# Patient Record
Sex: FEMALE | Race: WHITE | URBAN - METROPOLITAN AREA
[De-identification: names, ages, dates, MRNs, and addresses within clinical notes are randomized per-mention and may not be internally consistent; named-entity substitution may affect disease eponyms.]

---

## 2022-12-13 NOTE — PROGRESS NOTES
[facility]   Cardiac Evaluation      Patient: Bradford Ortiz  YOB: 1934         Chief Complaint   Patient presents with    Hypertension    Hyperlipidemia        Referring provider: Kaela Boss MD, MD    History of Present Illness:    Ms Trey Brown is seen as a new patient. She is a self referral after hospitalization 10/3-10/4/22. Horton Cheadle was hospitalized with expressive aphasia and hand numbness for 2 hours prior to hospital arrival. Her symptoms resolved. Carotid doppler revealed <50% stenosis bilaterally. She had an echo with EF 55%, moderate AR, mild MR. She was started on ASA and Lipitor 80mg daily. She had previously been taken off Simvastatin by her PMD because of her age that she took for 40+ years. She has a strong family history of early heart disease in her mother and aunts. Ms Trey Brown states she feels well now. She has no residual affects from TIA. She fell 8/22 and fractured her left hip and underwent surgery. Horton Cheadle denies chest pain, palpitations, tachycardia, dizziness, CHRISTIE, or edema. She is physically active. Past Medical History:   has a past medical history of Hyperlipidemia and Hypertension. Surgical History:   has a past surgical history that includes Hysterectomy and Partial hip arthroplasty (Left).      Current Outpatient Medications   Medication Sig Dispense Refill    atorvastatin (LIPITOR) 80 MG tablet       citalopram (CELEXA) 20 MG tablet       diclofenac (VOLTAREN) 75 MG EC tablet TAKE ONE TABLET BY MOUTH EVERY DAY      vitamin D (ERGOCALCIFEROL) 1.25 MG (55532 UT) CAPS capsule TAKE ONE CAPSULE BY MOUTH Every week on sunday      furosemide (LASIX) 20 MG tablet TAKE ONE TABLET BY MOUTH EVERY DAY      ibandronate (BONIVA) 150 MG tablet Take 150 mg by mouth      levothyroxine (SYNTHROID) 50 MCG tablet       losartan (COZAAR) 25 MG tablet       magnesium oxide (MAG-OX) 400 MG tablet Take 400 mg by mouth daily      pantoprazole (PROTONIX) 40 MG tablet       aspirin 81 MG EC tablet Take 81 mg by mouth daily       No current facility-administered medications for this visit. Allergies:  Patient has no known allergies. Social History:  Social History     Socioeconomic History    Marital status:      Spouse name: Not on file    Number of children: Not on file    Years of education: Not on file    Highest education level: Not on file   Occupational History    Not on file   Tobacco Use    Smoking status: Former     Types: Cigarettes     Quit date: 26     Years since quittin.9    Smokeless tobacco: Never   Substance and Sexual Activity    Alcohol use: Yes     Comment: social    Drug use: Not on file    Sexual activity: Not on file   Other Topics Concern    Not on file   Social History Narrative    Not on file     Social Determinants of Health     Financial Resource Strain: Not on file   Food Insecurity: Not on file   Transportation Needs: Not on file   Physical Activity: Not on file   Stress: Not on file   Social Connections: Not on file   Intimate Partner Violence: Not on file   Housing Stability: Not on file       Family History:   History reviewed. No pertinent family history. Family history has been reviewed and not pertinent except as noted above. Review of Systems:   Constitutional: there has been no unanticipated weight loss. No change in energy or activity level   Eyes: No visual changes   ENT: No Headaches, hearing loss or vertigo. No mouth sores or sore throat. Cardiovascular: Reviewed in HPI  Respiratory: No cough or wheezing, no sputum production. Gastrointestinal: No abdominal pain, appetite loss, blood in stools. No change in bowel or bladder habits. Genitourinary: No nocturia, dysuria, trouble voiding  Musculoskeletal:  No gait disturbance, weakness or joint complaints. Integumentary: No rash or pruritis. Neurological: No headache, change in muscle strength, numbness or tingling.  No change in gait, balance, coordination, mood, affect, memory, mentation, behavior. Psychiatric: No anxiety or depression  Endocrine: No malaise or fever  Hematologic/Lymphatic: No abnormal bruising or bleeding, blood clots or swollen lymph nodes. Allergic/Immunologic: No nasal congestion or hives. Physical Examination:    Vitals:    12/21/22 1400 12/21/22 1402   BP: (!) 144/70 (!) 140/70   Site: Left Upper Arm Left Upper Arm   Position: Sitting Sitting   Cuff Size: Medium Adult Medium Adult   Pulse:  77   SpO2:  98%   Weight: 172 lb (78 kg)    Height: 5' 5\" (1.651 m)      Body mass index is 28.62 kg/m². Wt Readings from Last 3 Encounters:   12/21/22 172 lb (78 kg)      BP Readings from Last 3 Encounters:   12/21/22 (!) 140/70        Physical Examination:    CONSTITUTIONAL: Well developed, well nourished  EYES: PERRLA. No xanthelasma, sclera non icteric  EARS,NOSE,MOUTH,THROAT:  Mucous membranes moist, normal hearing  NECK: Supple, JVP normal, thyroid not enlarged. Carotids 2+ without bruits  RESPIRATORY: Normal effort, no rales or rhonchi  CARDIOVASCULAR: Normal PMI, regular rate and rhythm, no murmurs, rub or gallop. No edema. Radial pulses present and equal  CHEST: No scar or masses  ABDOMEN: Normal bowel sounds. No masses or tenderness. No bruit  MUSCULOSKELETAL: No clubbing or cyanosis. Moves all extremities well. Normal gait  SKIN:  Warm and dry. No rashes  NEUROLOGIC: Cranial nerves intact. Alert and oriented  PSYCHIATRIC: Calm affect. Appears to have normal judgement and insight        Assessment/Plan  1. Hypertension, unspecified type - on Losartan 25mg daily, b/p controlled    2. Other hyperlipidemia Lipids 10/4/22: ; TRIG 173; HDL 57;  - started on Lipitor 80mg daily after it had been stopped previously. She states she had taken Simvastatin for 40+ years   3. TIA (transient ischemic attack) - no residual affects. I  have told her she should not stop her statin sagain.   At her age she is a higher risk for stroke and needs this medication more than ever. Echo 10/4/22: EF 55-60%, grade I DD, moderate AR, mild MR, agitated saline bubble study negative for intra-cardiac shunting. Carotid doppler 10/4/22: <50% bilateral       EKG today>SR, RBBB    PLAN:  Will repeat lipids and CMP. She is encouraged to continue to stay active. FU     Scribe's attestation: This note was scribed in the presence of Dr Santi Machado MD by Gladys Aviles RN. The scribe's documentation has been prepared under my direction and personally reviewed by me in its entirety. I confirm that the note above accurately reflects all work, treatment, procedures, and medical decision making performed by me. Thank you for allowing to me to participate in the care of Robb Long.

## 2022-12-21 ENCOUNTER — OFFICE VISIT (OUTPATIENT)
Dept: CARDIOLOGY CLINIC | Age: 87
End: 2022-12-21

## 2022-12-21 VITALS
SYSTOLIC BLOOD PRESSURE: 140 MMHG | HEART RATE: 77 BPM | DIASTOLIC BLOOD PRESSURE: 70 MMHG | OXYGEN SATURATION: 98 % | HEIGHT: 65 IN | BODY MASS INDEX: 28.66 KG/M2 | WEIGHT: 172 LBS

## 2022-12-21 DIAGNOSIS — E78.49 OTHER HYPERLIPIDEMIA: ICD-10-CM

## 2022-12-21 DIAGNOSIS — G45.9 TIA (TRANSIENT ISCHEMIC ATTACK): ICD-10-CM

## 2022-12-21 DIAGNOSIS — I10 HYPERTENSION, UNSPECIFIED TYPE: Primary | ICD-10-CM

## 2022-12-21 PROBLEM — E78.2 MIXED HYPERLIPIDEMIA: Status: ACTIVE | Noted: 2019-01-22

## 2022-12-21 PROBLEM — I15.0 RENOVASCULAR HYPERTENSION: Status: ACTIVE | Noted: 2022-11-15

## 2022-12-21 RX ORDER — ATORVASTATIN CALCIUM 80 MG/1
TABLET, FILM COATED ORAL
COMMUNITY
Start: 2022-12-18

## 2022-12-21 RX ORDER — FUROSEMIDE 20 MG/1
TABLET ORAL
COMMUNITY
Start: 2022-11-28

## 2022-12-21 RX ORDER — MAGNESIUM OXIDE 400 MG/1
400 TABLET ORAL DAILY
COMMUNITY

## 2022-12-21 RX ORDER — LEVOTHYROXINE SODIUM 0.05 MG/1
TABLET ORAL
COMMUNITY
Start: 2022-12-18

## 2022-12-21 RX ORDER — LOSARTAN POTASSIUM 25 MG/1
TABLET ORAL
COMMUNITY
Start: 2022-12-18

## 2022-12-21 RX ORDER — DICLOFENAC SODIUM 75 MG/1
TABLET, DELAYED RELEASE ORAL
COMMUNITY
Start: 2022-12-15

## 2022-12-21 RX ORDER — ASPIRIN 81 MG/1
81 TABLET ORAL DAILY
COMMUNITY

## 2022-12-21 RX ORDER — PANTOPRAZOLE SODIUM 40 MG/1
TABLET, DELAYED RELEASE ORAL
COMMUNITY
Start: 2022-12-18

## 2022-12-21 RX ORDER — CITALOPRAM 20 MG/1
TABLET ORAL
COMMUNITY
Start: 2022-12-18

## 2022-12-21 RX ORDER — IBANDRONATE SODIUM 150 MG/1
150 TABLET, FILM COATED ORAL
COMMUNITY
Start: 2022-04-07

## 2022-12-21 RX ORDER — ERGOCALCIFEROL 1.25 MG/1
CAPSULE ORAL
COMMUNITY
Start: 2022-11-21

## 2023-01-23 ENCOUNTER — OFFICE VISIT (OUTPATIENT)
Dept: INTERNAL MEDICINE CLINIC | Age: 88
End: 2023-01-23

## 2023-01-23 ENCOUNTER — TELEPHONE (OUTPATIENT)
Dept: INTERNAL MEDICINE CLINIC | Age: 88
End: 2023-01-23

## 2023-01-23 VITALS
HEART RATE: 70 BPM | DIASTOLIC BLOOD PRESSURE: 70 MMHG | BODY MASS INDEX: 29.62 KG/M2 | OXYGEN SATURATION: 99 % | SYSTOLIC BLOOD PRESSURE: 122 MMHG | WEIGHT: 177.8 LBS | HEIGHT: 65 IN

## 2023-01-23 DIAGNOSIS — I67.9 CVD (CEREBROVASCULAR DISEASE): ICD-10-CM

## 2023-01-23 DIAGNOSIS — E78.2 MIXED HYPERLIPIDEMIA: ICD-10-CM

## 2023-01-23 DIAGNOSIS — E78.49 OTHER HYPERLIPIDEMIA: ICD-10-CM

## 2023-01-23 DIAGNOSIS — E55.9 VITAMIN D DEFICIENCY: ICD-10-CM

## 2023-01-23 DIAGNOSIS — E06.3 HYPOTHYROIDISM DUE TO HASHIMOTO'S THYROIDITIS: ICD-10-CM

## 2023-01-23 DIAGNOSIS — M35.3 POLYMYALGIA RHEUMATICA SYNDROME (HCC): ICD-10-CM

## 2023-01-23 DIAGNOSIS — N18.31 STAGE 3A CHRONIC KIDNEY DISEASE (HCC): ICD-10-CM

## 2023-01-23 DIAGNOSIS — F41.9 ANXIETY: ICD-10-CM

## 2023-01-23 DIAGNOSIS — I10 HYPERTENSION, UNSPECIFIED TYPE: Primary | ICD-10-CM

## 2023-01-23 DIAGNOSIS — E03.8 HYPOTHYROIDISM DUE TO HASHIMOTO'S THYROIDITIS: ICD-10-CM

## 2023-01-23 DIAGNOSIS — I15.0 RENOVASCULAR HYPERTENSION: ICD-10-CM

## 2023-01-23 PROBLEM — M81.0 POSTMENOPAUSAL OSTEOPOROSIS: Status: ACTIVE | Noted: 2022-05-15

## 2023-01-23 PROBLEM — M15.9 PRIMARY OSTEOARTHRITIS INVOLVING MULTIPLE JOINTS: Status: ACTIVE | Noted: 2022-05-15

## 2023-01-23 PROBLEM — K21.9 GERD (GASTROESOPHAGEAL REFLUX DISEASE): Status: ACTIVE | Noted: 2019-01-22

## 2023-01-23 PROBLEM — R94.2 DECREASED DIFFUSION CAPACITY OF LUNG: Status: ACTIVE | Noted: 2020-07-30

## 2023-01-23 PROBLEM — M15.0 PRIMARY OSTEOARTHRITIS INVOLVING MULTIPLE JOINTS: Status: ACTIVE | Noted: 2022-05-15

## 2023-01-23 PROBLEM — K59.04 FUNCTIONAL CONSTIPATION: Status: ACTIVE | Noted: 2019-01-22

## 2023-01-23 PROBLEM — D47.2 MGUS (MONOCLONAL GAMMOPATHY OF UNKNOWN SIGNIFICANCE): Status: ACTIVE | Noted: 2021-03-15

## 2023-01-23 RX ORDER — LEVOTHYROXINE SODIUM 0.05 MG/1
50 TABLET ORAL DAILY
Qty: 30 TABLET | Refills: 1 | Status: SHIPPED | OUTPATIENT
Start: 2023-01-23

## 2023-01-23 SDOH — ECONOMIC STABILITY: FOOD INSECURITY: WITHIN THE PAST 12 MONTHS, THE FOOD YOU BOUGHT JUST DIDN'T LAST AND YOU DIDN'T HAVE MONEY TO GET MORE.: NEVER TRUE

## 2023-01-23 SDOH — ECONOMIC STABILITY: FOOD INSECURITY: WITHIN THE PAST 12 MONTHS, YOU WORRIED THAT YOUR FOOD WOULD RUN OUT BEFORE YOU GOT MONEY TO BUY MORE.: NEVER TRUE

## 2023-01-23 ASSESSMENT — ENCOUNTER SYMPTOMS
COLOR CHANGE: 0
SINUS PAIN: 0
BLOOD IN STOOL: 0
SHORTNESS OF BREATH: 0
CONSTIPATION: 0
ABDOMINAL PAIN: 0
COUGH: 0
WHEEZING: 0
CHEST TIGHTNESS: 0

## 2023-01-23 ASSESSMENT — PATIENT HEALTH QUESTIONNAIRE - PHQ9
SUM OF ALL RESPONSES TO PHQ QUESTIONS 1-9: 0
SUM OF ALL RESPONSES TO PHQ QUESTIONS 1-9: 0
2. FEELING DOWN, DEPRESSED OR HOPELESS: 0
SUM OF ALL RESPONSES TO PHQ QUESTIONS 1-9: 0
1. LITTLE INTEREST OR PLEASURE IN DOING THINGS: 0
SUM OF ALL RESPONSES TO PHQ9 QUESTIONS 1 & 2: 0
SUM OF ALL RESPONSES TO PHQ QUESTIONS 1-9: 0

## 2023-01-23 ASSESSMENT — SOCIAL DETERMINANTS OF HEALTH (SDOH): HOW HARD IS IT FOR YOU TO PAY FOR THE VERY BASICS LIKE FOOD, HOUSING, MEDICAL CARE, AND HEATING?: NOT HARD AT ALL

## 2023-01-23 NOTE — PROGRESS NOTES
Fatimah Hernández (:  10/25/1934) is a 88 y.o. female,New patient, here for evaluation of the following chief complaint(s):  Established New Doctor         ASSESSMENT/PLAN:  1. Hypertension, unspecified type  -     Comprehensive Metabolic Panel; Future  -     CBC with Auto Differential; Future  2. Anxiety  3. Stage 3a chronic kidney disease (HCC)  4. Hypothyroidism due to Hashimoto's thyroiditis  -     TSH; Future  -     T4, Free; Future  5. Other hyperlipidemia  6. Renovascular hypertension  7. Vitamin D deficiency  -     Vitamin D 25 Hydroxy; Future  8. Polymyalgia rheumatica syndrome (HCC)  -     Sedimentation Rate; Future  -     C-Reactive Protein; Future  9. CVD (cerebrovascular disease)  -     Lipid Panel; Future  10. Mixed hyperlipidemia  -     Lipid Panel; Future  .  The patient has medical history medications past history preventative care    The patient has history of an underactive thyroid she was recently had her dose adjusted but she did not start yet on the Synthroid of 75 mcg upon reviewing her labs her TSH was only 4.6-20.1 above the cutoff and given her age that is significantly lower than the target so at this stage I did advise the patient not to to start on the new dose and as a matter fact we should consider lowering the dose if at all possible so the patient is going to take half of these 0.75 mg to see if that will go well and she will report to me if she start having any side effects or symptoms    The patient has history of cerebrovascular disease she did have at least one remote stroke in the past she is on statin treatment and aspirin at this point    We will get a check her liver function test as well as her cholesterol profile in about 6 weeks    Patient did have history of polymyalgia rheumatica and at this point we will get a check her inflammatory markers.    Patient has been on vitamin D replacement therapy with a 50,000 units/week we will get a check her levels make sure that  she is not elevated and depending on the reading will decide if she should continue on such a high dose or if she should go down to the maintenance dosage of it    Patient blood pressure is well controlled we will get a keep her on the current medication  Return in about 3 months (around 4/23/2023). Subjective   SUBJECTIVE/OBJECTIVE:    Lab Review   No results found for: NA, K, CO2, BUN, CREATININE, GLUCOSE, CALCIUM  No results found for: WBC, HGB, HCT, MCV, PLT  No results found for: CHOL, TRIG, HDL, LDLDIRECT    Vitals 1/23/2023 12/21/2022 40/52/6821   SYSTOLIC [phone]   DIASTOLIC 70 70 70   Site - Left Upper Arm Left Upper Arm   Position - Sitting Sitting   Cuff Size - Medium Adult Medium Adult   Pulse 70 77 -   SpO2 99 98 -   Weight 177 lb 12.8 oz - 172 lb   Height 5' 5\" - 5' 5\"   Body mass index 29.58 kg/m2 - 28.62 kg/m2   Some recent data might be hidden       Hypertension  This is a chronic problem. The current episode started more than 1 year ago. The problem is unchanged. The problem is controlled. Pertinent negatives include no chest pain, headaches, palpitations, peripheral edema, PND or shortness of breath. Hyperlipidemia  This is a chronic problem. The current episode started more than 1 year ago. The problem is controlled. Recent lipid tests were reviewed and are normal. Pertinent negatives include no chest pain, myalgias or shortness of breath. Review of Systems   Constitutional:  Negative for activity change, appetite change, fatigue and unexpected weight change. HENT:  Negative for congestion, ear pain and sinus pain. Respiratory:  Negative for cough, chest tightness, shortness of breath and wheezing. Cardiovascular:  Negative for chest pain, palpitations and PND. Gastrointestinal:  Negative for abdominal pain, blood in stool and constipation. Endocrine: Negative for cold intolerance, heat intolerance and polyuria.    Genitourinary:  Negative for dysuria, frequency and urgency. Musculoskeletal:  Negative for arthralgias and myalgias. Skin:  Negative for color change and rash. Neurological:  Negative for weakness and headaches. Hematological:  Negative for adenopathy. Does not bruise/bleed easily. Psychiatric/Behavioral:  Negative for agitation, dysphoric mood and sleep disturbance. Objective   Physical Exam  Vitals and nursing note reviewed. Constitutional:       General: She is not in acute distress. Appearance: Normal appearance. HENT:      Head: Normocephalic and atraumatic. Right Ear: Tympanic membrane normal.      Left Ear: Tympanic membrane normal.      Nose: Nose normal.   Eyes:      Extraocular Movements: Extraocular movements intact. Conjunctiva/sclera: Conjunctivae normal.      Pupils: Pupils are equal, round, and reactive to light. Neck:      Vascular: No carotid bruit. Cardiovascular:      Rate and Rhythm: Normal rate and regular rhythm. Pulses: Normal pulses. Heart sounds: No murmur heard. Pulmonary:      Effort: Pulmonary effort is normal. No respiratory distress. Breath sounds: Normal breath sounds. Abdominal:      General: Abdomen is flat. Bowel sounds are normal. There is no distension. Palpations: Abdomen is soft. Tenderness: There is no abdominal tenderness. Musculoskeletal:         General: No swelling, tenderness or deformity. Cervical back: Normal range of motion and neck supple. No rigidity or tenderness. Right lower leg: No edema. Left lower leg: No edema. Lymphadenopathy:      Cervical: No cervical adenopathy. Skin:     Coloration: Skin is not jaundiced. Findings: No bruising, erythema or lesion. Neurological:      General: No focal deficit present. Mental Status: She is alert and oriented to person, place, and time. Cranial Nerves: No cranial nerve deficit. Motor: No weakness.       Gait: Gait normal.          This dictation was generated by voice recognition computer software. Although all attempts are made to edit the dictation for accuracy, there may be errors in the transcription that are not intended. An electronic signature was used to authenticate this note.     --Xiomara Alfredo MD

## 2023-01-23 NOTE — TELEPHONE ENCOUNTER
Called and spoke with pt in Mongolian, conveyed below results with understanding   Pt  calling requesting refill of  Levothyroxine---50 mcg      Last written   12/18/22 (previous Dr)  Last OV  1/23/23  Next OV  none   Last recommended OV NA    Please send to   [facility]

## 2023-01-24 DIAGNOSIS — E53.8 B12 DEFICIENCY: ICD-10-CM

## 2023-01-24 DIAGNOSIS — E53.8 B12 DEFICIENCY: Primary | ICD-10-CM

## 2023-01-24 DIAGNOSIS — I10 HYPERTENSION, UNSPECIFIED TYPE: ICD-10-CM

## 2023-01-24 DIAGNOSIS — E03.8 HYPOTHYROIDISM DUE TO HASHIMOTO'S THYROIDITIS: ICD-10-CM

## 2023-01-24 DIAGNOSIS — E55.9 VITAMIN D DEFICIENCY: ICD-10-CM

## 2023-01-24 DIAGNOSIS — M35.3 POLYMYALGIA RHEUMATICA SYNDROME (HCC): ICD-10-CM

## 2023-01-24 DIAGNOSIS — I67.9 CVD (CEREBROVASCULAR DISEASE): ICD-10-CM

## 2023-01-24 DIAGNOSIS — E78.2 MIXED HYPERLIPIDEMIA: ICD-10-CM

## 2023-01-24 DIAGNOSIS — E06.3 HYPOTHYROIDISM DUE TO HASHIMOTO'S THYROIDITIS: ICD-10-CM

## 2023-01-24 LAB
A/G RATIO: 1.7 (ref 1.1–2.2)
ALBUMIN SERPL-MCNC: 4.2 G/DL (ref 3.4–5)
ALP BLD-CCNC: 80 U/L (ref 40–129)
ALT SERPL-CCNC: 17 U/L (ref 10–40)
ANION GAP SERPL CALCULATED.3IONS-SCNC: 17 MMOL/L (ref 3–16)
AST SERPL-CCNC: 25 U/L (ref 15–37)
BASOPHILS ABSOLUTE: 0 K/UL (ref 0–0.2)
BASOPHILS RELATIVE PERCENT: 0.5 %
BILIRUB SERPL-MCNC: 0.7 MG/DL (ref 0–1)
BUN BLDV-MCNC: 20 MG/DL (ref 7–20)
C-REACTIVE PROTEIN: <3 MG/L (ref 0–5.1)
CALCIUM SERPL-MCNC: 9.3 MG/DL (ref 8.3–10.6)
CHLORIDE BLD-SCNC: 100 MMOL/L (ref 99–110)
CHOLESTEROL, TOTAL: 176 MG/DL (ref 0–199)
CO2: 24 MMOL/L (ref 21–32)
CREAT SERPL-MCNC: 0.7 MG/DL (ref 0.6–1.2)
EOSINOPHILS ABSOLUTE: 0.1 K/UL (ref 0–0.6)
EOSINOPHILS RELATIVE PERCENT: 1.5 %
FOLATE: >20 NG/ML (ref 4.78–24.2)
GFR SERPL CREATININE-BSD FRML MDRD: >60 ML/MIN/{1.73_M2}
GLUCOSE BLD-MCNC: 95 MG/DL (ref 70–99)
HCT VFR BLD CALC: 32.6 % (ref 36–48)
HDLC SERPL-MCNC: 80 MG/DL (ref 40–60)
HEMOGLOBIN: 10.8 G/DL (ref 12–16)
LDL CHOLESTEROL CALCULATED: 81 MG/DL
LYMPHOCYTES ABSOLUTE: 1.3 K/UL (ref 1–5.1)
LYMPHOCYTES RELATIVE PERCENT: 20.3 %
MCH RBC QN AUTO: 34.2 PG (ref 26–34)
MCHC RBC AUTO-ENTMCNC: 33.3 G/DL (ref 31–36)
MCV RBC AUTO: 102.8 FL (ref 80–100)
MONOCYTES ABSOLUTE: 0.5 K/UL (ref 0–1.3)
MONOCYTES RELATIVE PERCENT: 8.4 %
NEUTROPHILS ABSOLUTE: 4.4 K/UL (ref 1.7–7.7)
NEUTROPHILS RELATIVE PERCENT: 69.3 %
PDW BLD-RTO: 14.4 % (ref 12.4–15.4)
PLATELET # BLD: 268 K/UL (ref 135–450)
PMV BLD AUTO: 8.7 FL (ref 5–10.5)
POTASSIUM SERPL-SCNC: 4.8 MMOL/L (ref 3.5–5.1)
RBC # BLD: 3.17 M/UL (ref 4–5.2)
SEDIMENTATION RATE, ERYTHROCYTE: 19 MM/HR (ref 0–30)
SODIUM BLD-SCNC: 141 MMOL/L (ref 136–145)
T4 FREE: 1.4 NG/DL (ref 0.9–1.8)
TOTAL PROTEIN: 6.7 G/DL (ref 6.4–8.2)
TRIGL SERPL-MCNC: 75 MG/DL (ref 0–150)
TSH SERPL DL<=0.05 MIU/L-ACNC: 2.53 UIU/ML (ref 0.27–4.2)
VITAMIN B-12: 381 PG/ML (ref 211–911)
VITAMIN D 25-HYDROXY: 56.8 NG/ML
VLDLC SERPL CALC-MCNC: 15 MG/DL
WBC # BLD: 6.3 K/UL (ref 4–11)

## 2023-01-24 RX ORDER — CYANOCOBALAMIN 1000 UG/ML
1000 INJECTION, SOLUTION INTRAMUSCULAR; SUBCUTANEOUS
Qty: 4 ML | Refills: 0 | Status: SHIPPED | OUTPATIENT
Start: 2023-01-24

## 2023-02-06 ENCOUNTER — TELEPHONE (OUTPATIENT)
Dept: INTERNAL MEDICINE CLINIC | Age: 88
End: 2023-02-06

## 2023-02-06 NOTE — TELEPHONE ENCOUNTER
She does not need to be on the shots for the rest of her life usually at the start of the treatment I had the patient get the shots to get very high-dose very quickly into their body and then after that they continue with the pills

## 2023-02-06 NOTE — TELEPHONE ENCOUNTER
Patient states you told her she wouldn't have to take the injection of b12. Told her I would clarify and she could come into the office or we could call an order of needles in for pt to be able to administer the injection.

## 2023-02-06 NOTE — TELEPHONE ENCOUNTER
Pt calling she received B12 tablets and 4 vials of B12 for shots---she thought you told her she didn't have to do those---plus they came with no needles---please call the pt at [phone].   Thanks

## 2023-02-15 ENCOUNTER — NURSE ONLY (OUTPATIENT)
Dept: INTERNAL MEDICINE CLINIC | Age: 88
End: 2023-02-15
Payer: MEDICARE

## 2023-02-15 DIAGNOSIS — E53.8 B12 DEFICIENCY: Primary | ICD-10-CM

## 2023-02-15 PROCEDURE — 96372 THER/PROPH/DIAG INJ SC/IM: CPT | Performed by: INTERNAL MEDICINE

## 2023-02-15 RX ORDER — CYANOCOBALAMIN 1000 UG/ML
1000 INJECTION, SOLUTION INTRAMUSCULAR; SUBCUTANEOUS ONCE
Status: COMPLETED | OUTPATIENT
Start: 2023-02-15 | End: 2023-02-15

## 2023-02-15 RX ADMIN — CYANOCOBALAMIN 1000 MCG: 1000 INJECTION, SOLUTION INTRAMUSCULAR; SUBCUTANEOUS at 12:26

## 2023-02-22 ENCOUNTER — NURSE ONLY (OUTPATIENT)
Dept: INTERNAL MEDICINE CLINIC | Age: 88
End: 2023-02-22
Payer: MEDICARE

## 2023-02-22 DIAGNOSIS — E53.8 B12 DEFICIENCY: Primary | ICD-10-CM

## 2023-02-22 PROCEDURE — 96372 THER/PROPH/DIAG INJ SC/IM: CPT | Performed by: INTERNAL MEDICINE

## 2023-02-22 RX ORDER — CYANOCOBALAMIN 1000 UG/ML
1000 INJECTION, SOLUTION INTRAMUSCULAR; SUBCUTANEOUS ONCE
Status: COMPLETED | OUTPATIENT
Start: 2023-02-22 | End: 2023-02-22

## 2023-02-22 RX ADMIN — CYANOCOBALAMIN 1000 MCG: 1000 INJECTION, SOLUTION INTRAMUSCULAR; SUBCUTANEOUS at 16:14

## 2023-02-27 ENCOUNTER — PATIENT MESSAGE (OUTPATIENT)
Dept: INTERNAL MEDICINE CLINIC | Age: 88
End: 2023-02-27

## 2023-02-27 RX ORDER — ATORVASTATIN CALCIUM 80 MG/1
80 TABLET, FILM COATED ORAL DAILY
Qty: 30 TABLET | Refills: 0 | Status: SHIPPED | OUTPATIENT
Start: 2023-02-27

## 2023-02-27 RX ORDER — FUROSEMIDE 20 MG/1
TABLET ORAL
Qty: 60 TABLET | Refills: 0 | Status: SHIPPED | OUTPATIENT
Start: 2023-02-27

## 2023-02-27 RX ORDER — DICLOFENAC SODIUM 75 MG/1
TABLET, DELAYED RELEASE ORAL
Qty: 60 TABLET | Refills: 0 | Status: SHIPPED | OUTPATIENT
Start: 2023-02-27

## 2023-02-27 NOTE — TELEPHONE ENCOUNTER
Last OV: 1/23/2023  Next OV: Visit date not found  around 4/23/2023). Last fill:  Refills:      From: Jameson Pierre  To: Dr. Koroma Endow: 2/27/2023 10:54 AM EST  Subject: Refills    I need refills on 3 prescriptions. Atorvastin, Flourisomide and [place]. I was unable to request on line. My pharmacy is [facility]. [phone]. Thank you.

## 2023-02-28 ENCOUNTER — TELEPHONE (OUTPATIENT)
Dept: INTERNAL MEDICINE CLINIC | Age: 88
End: 2023-02-28

## 2023-02-28 DIAGNOSIS — R05.9 COUGH, UNSPECIFIED TYPE: Primary | ICD-10-CM

## 2023-02-28 NOTE — TELEPHONE ENCOUNTER
Patient took home covid test.  She is unsure of the directions and reading, only says she has a faint line. She is requesting covid test be ordered to have at office.

## 2023-02-28 NOTE — TELEPHONE ENCOUNTER
----- Message from Rosalie Chaves sent at 2/27/2023  3:57 PM EST -----  Subject: Message to Provider    QUESTIONS  Information for Provider? Pt took a home rapid Covid test. She did mention   that she's seeing a faint +line  ---------------------------------------------------------------------------  --------------  Rafael Mt INFO  [phone]; OK to leave message on voicemail  ---------------------------------------------------------------------------  --------------  SCRIPT ANSWERS  Relationship to Patient?  Self

## 2023-03-01 ENCOUNTER — NURSE ONLY (OUTPATIENT)
Dept: INTERNAL MEDICINE CLINIC | Age: 88
End: 2023-03-01

## 2023-03-01 DIAGNOSIS — R05.9 COUGH, UNSPECIFIED TYPE: ICD-10-CM

## 2023-03-01 DIAGNOSIS — E53.8 B12 DEFICIENCY: Primary | ICD-10-CM

## 2023-03-01 RX ORDER — CYANOCOBALAMIN 1000 UG/ML
1000 INJECTION, SOLUTION INTRAMUSCULAR; SUBCUTANEOUS ONCE
Status: COMPLETED | OUTPATIENT
Start: 2023-03-01 | End: 2023-03-01

## 2023-03-01 RX ADMIN — CYANOCOBALAMIN 1000 MCG: 1000 INJECTION, SOLUTION INTRAMUSCULAR; SUBCUTANEOUS at 12:38

## 2023-03-02 LAB — SARS-COV-2: NOT DETECTED

## 2023-03-03 ENCOUNTER — HOSPITAL ENCOUNTER (OUTPATIENT)
Age: 88
Discharge: HOME | End: 2023-03-03
Payer: MEDICARE

## 2023-03-03 ENCOUNTER — HOSPITAL ENCOUNTER (OUTPATIENT)
Dept: GENERAL RADIOLOGY | Age: 88
Discharge: HOME | End: 2023-03-03
Payer: MEDICARE

## 2023-03-03 ENCOUNTER — OFFICE VISIT (OUTPATIENT)
Dept: INTERNAL MEDICINE CLINIC | Age: 88
End: 2023-03-03
Payer: MEDICARE

## 2023-03-03 VITALS
HEART RATE: 103 BPM | DIASTOLIC BLOOD PRESSURE: 84 MMHG | HEIGHT: 65 IN | BODY MASS INDEX: 29.49 KG/M2 | OXYGEN SATURATION: 94 % | SYSTOLIC BLOOD PRESSURE: 136 MMHG | WEIGHT: 177 LBS

## 2023-03-03 DIAGNOSIS — J06.9 URTI (ACUTE UPPER RESPIRATORY INFECTION): ICD-10-CM

## 2023-03-03 DIAGNOSIS — E53.8 B12 DEFICIENCY: ICD-10-CM

## 2023-03-03 DIAGNOSIS — I10 HYPERTENSION, UNSPECIFIED TYPE: ICD-10-CM

## 2023-03-03 DIAGNOSIS — J06.9 URTI (ACUTE UPPER RESPIRATORY INFECTION): Primary | ICD-10-CM

## 2023-03-03 PROCEDURE — 99214 OFFICE O/P EST MOD 30 MIN: CPT | Performed by: INTERNAL MEDICINE

## 2023-03-03 PROCEDURE — 71046 X-RAY EXAM CHEST 2 VIEWS: CPT

## 2023-03-03 PROCEDURE — 1123F ACP DISCUSS/DSCN MKR DOCD: CPT | Performed by: INTERNAL MEDICINE

## 2023-03-03 RX ORDER — ALBUTEROL SULFATE 90 UG/1
2 AEROSOL, METERED RESPIRATORY (INHALATION) 4 TIMES DAILY PRN
Qty: 54 G | Refills: 1 | Status: SHIPPED | OUTPATIENT
Start: 2023-03-03

## 2023-03-03 RX ORDER — BENZONATATE 200 MG/1
200 CAPSULE ORAL 3 TIMES DAILY PRN
Qty: 30 CAPSULE | Refills: 0 | Status: SHIPPED | OUTPATIENT
Start: 2023-03-03 | End: 2023-03-10

## 2023-03-03 RX ORDER — AZITHROMYCIN 250 MG/1
250 TABLET, FILM COATED ORAL SEE ADMIN INSTRUCTIONS
Qty: 6 TABLET | Refills: 0 | Status: SHIPPED | OUTPATIENT
Start: 2023-03-03 | End: 2023-03-08

## 2023-03-03 RX ORDER — PREDNISONE 20 MG/1
20 TABLET ORAL 2 TIMES DAILY
Qty: 10 TABLET | Refills: 0 | Status: SHIPPED | OUTPATIENT
Start: 2023-03-03 | End: 2023-03-08

## 2023-03-03 SDOH — ECONOMIC STABILITY: INCOME INSECURITY: HOW HARD IS IT FOR YOU TO PAY FOR THE VERY BASICS LIKE FOOD, HOUSING, MEDICAL CARE, AND HEATING?: NOT VERY HARD

## 2023-03-03 SDOH — ECONOMIC STABILITY: HOUSING INSECURITY
IN THE LAST 12 MONTHS, WAS THERE A TIME WHEN YOU DID NOT HAVE A STEADY PLACE TO SLEEP OR SLEPT IN A SHELTER (INCLUDING NOW)?: NO

## 2023-03-03 SDOH — ECONOMIC STABILITY: FOOD INSECURITY: WITHIN THE PAST 12 MONTHS, THE FOOD YOU BOUGHT JUST DIDN'T LAST AND YOU DIDN'T HAVE MONEY TO GET MORE.: NEVER TRUE

## 2023-03-03 SDOH — ECONOMIC STABILITY: FOOD INSECURITY: WITHIN THE PAST 12 MONTHS, YOU WORRIED THAT YOUR FOOD WOULD RUN OUT BEFORE YOU GOT MONEY TO BUY MORE.: NEVER TRUE

## 2023-03-03 ASSESSMENT — ENCOUNTER SYMPTOMS
WHEEZING: 0
RHINORRHEA: 0
SHORTNESS OF BREATH: 0
COUGH: 1
SORE THROAT: 0

## 2023-03-03 NOTE — PROGRESS NOTES
Becky Hermosillo (:  1934) is a 80 y.o. female,Established patient, here for evaluation of the following chief complaint(s):  Congestion and Cough (Shortness of breath when moving around )         ASSESSMENT/PLAN:  1. URTI (acute upper respiratory infection)  -     benzonatate (TESSALON) 200 MG capsule; Take 1 capsule by mouth 3 times daily as needed for Cough, Disp-30 capsule, R-0Normal  -     predniSONE (DELTASONE) 20 MG tablet; Take 1 tablet by mouth 2 times daily for 5 days, Disp-10 tablet, R-0Normal  -     azithromycin (ZITHROMAX) 250 MG tablet; Take 1 tablet by mouth See Admin Instructions for 5 days 500mg on day 1 followed by 250mg on days 2 - 5, Disp-6 tablet, R-0Normal  -     XR CHEST STANDARD (2 VW); Future  -     albuterol sulfate HFA (VENTOLIN HFA) 108 (90 Base) MCG/ACT inhaler; Inhale 2 puffs into the lungs 4 times daily as needed for Wheezing, Disp-54 g, R-1Normal  2. Hypertension, unspecified type  3. B12 deficiency  -     CBC with Auto Differential; Future  -     Vitamin B12 & Folate; Future  Symptomatology she has symptoms consistent with a bronchitis her COVID is negative and its been more than 5 days at any rate but given that she is having wheezes in her lungs a chest x-ray will be done to make sure that she does not have a pneumonic process if the patient symptoms change or get worse she is to go to the emergency room directly for now we will get a start her on antibiotics prednisone cough medicine and inhalers chest x-ray is to be done this morning    Patient is taking her B12 we will get a check her blood count and B12 level before prior to her next visit    Patient blood pressure is well controlled at this time and again continue to monitor clinically  Return in about 6 weeks (around 2023).          Subjective   SUBJECTIVE/OBJECTIVE:    Lab Review   Lab Results   Component Value Date/Time     2023 10:53 AM    K 4.8 2023 10:53 AM    CO2 24 2023 10:53 AM BUN 20 01/24/2023 10:53 AM    CREATININE 0.7 01/24/2023 10:53 AM    GLUCOSE 95 01/24/2023 10:53 AM    CALCIUM 9.3 01/24/2023 10:53 AM     Lab Results   Component Value Date/Time    WBC 6.3 01/24/2023 10:53 AM    HGB 10.8 01/24/2023 10:53 AM    HCT 32.6 01/24/2023 10:53 AM    .8 01/24/2023 10:53 AM     01/24/2023 10:53 AM     Lab Results   Component Value Date/Time    CHOL 176 01/24/2023 10:53 AM    TRIG 75 01/24/2023 10:53 AM    HDL 80 01/24/2023 10:53 AM       Vitals 3/3/2023 1/23/2023 87/78/0215   SYSTOLIC 866 531 244   DIASTOLIC 84 70 70   Site - - Left Upper Arm   Position - - Sitting   Cuff Size - - Medium Adult   Pulse 103 70 77   SpO2 94 99 98   Weight 177 lb 177 lb 12.8 oz -   Height 5' 5\" 5' 5\" -   Body mass index 29.45 kg/m2 29.58 kg/m2 -   Some recent data might be hidden       Cough  This is a new problem. The current episode started yesterday. The cough is Productive of sputum and productive of purulent sputum. Pertinent negatives include no chest pain, chills, ear congestion, ear pain, rhinorrhea, sore throat, shortness of breath, sweats, weight loss or wheezing. Hypertension  This is a chronic problem. The current episode started more than 1 year ago. The problem is unchanged. The problem is controlled. Pertinent negatives include no chest pain, peripheral edema, PND, shortness of breath or sweats. Review of Systems   Constitutional:  Negative for chills and weight loss. HENT:  Negative for ear pain, rhinorrhea and sore throat. Respiratory:  Positive for cough. Negative for shortness of breath and wheezing. Cardiovascular:  Negative for chest pain and PND. Objective   Physical Exam       This dictation was generated by voice recognition computer software. Although all attempts are made to edit the dictation for accuracy, there may be errors in the transcription that are not intended. An electronic signature was used to authenticate this note.     --Hunter Theodore Debora Miner MD

## 2023-03-08 ENCOUNTER — NURSE ONLY (OUTPATIENT)
Dept: INTERNAL MEDICINE CLINIC | Age: 88
End: 2023-03-08
Payer: MEDICARE

## 2023-03-08 DIAGNOSIS — E53.8 B12 DEFICIENCY: Primary | ICD-10-CM

## 2023-03-08 PROCEDURE — 96372 THER/PROPH/DIAG INJ SC/IM: CPT | Performed by: INTERNAL MEDICINE

## 2023-03-08 RX ORDER — CYANOCOBALAMIN 1000 UG/ML
1000 INJECTION, SOLUTION INTRAMUSCULAR; SUBCUTANEOUS ONCE
Status: COMPLETED | OUTPATIENT
Start: 2023-03-08 | End: 2023-03-08

## 2023-03-08 RX ADMIN — CYANOCOBALAMIN 1000 MCG: 1000 INJECTION, SOLUTION INTRAMUSCULAR; SUBCUTANEOUS at 15:07

## 2023-03-24 RX ORDER — LEVOTHYROXINE SODIUM 0.05 MG/1
50 TABLET ORAL DAILY
Qty: 30 TABLET | Refills: 1 | Status: SHIPPED | OUTPATIENT
Start: 2023-03-24

## 2023-03-24 RX ORDER — ATORVASTATIN CALCIUM 80 MG/1
80 TABLET, FILM COATED ORAL DAILY
Qty: 30 TABLET | Refills: 0 | Status: SHIPPED | OUTPATIENT
Start: 2023-03-24

## 2023-03-28 ENCOUNTER — TELEPHONE (OUTPATIENT)
Dept: INTERNAL MEDICINE CLINIC | Age: 88
End: 2023-03-28

## 2023-03-28 RX ORDER — CITALOPRAM 20 MG/1
20 TABLET ORAL DAILY
Qty: 30 TABLET | Refills: 2 | Status: SHIPPED | OUTPATIENT
Start: 2023-03-28

## 2023-03-28 NOTE — TELEPHONE ENCOUNTER
Pt calling requesting refill of   Citalopram   Last written  12/18/22  Last OV  3/3/23  Next OV  4/14/23  Last recommended OV   NA    Please send to   [address]

## 2023-03-30 ENCOUNTER — OFFICE VISIT (OUTPATIENT)
Dept: INTERNAL MEDICINE CLINIC | Age: 88
End: 2023-03-30
Payer: MEDICARE

## 2023-03-30 VITALS
SYSTOLIC BLOOD PRESSURE: 136 MMHG | OXYGEN SATURATION: 97 % | BODY MASS INDEX: 28.79 KG/M2 | WEIGHT: 173 LBS | HEART RATE: 7 BPM | DIASTOLIC BLOOD PRESSURE: 88 MMHG

## 2023-03-30 DIAGNOSIS — M25.511 ACUTE PAIN OF RIGHT SHOULDER: ICD-10-CM

## 2023-03-30 DIAGNOSIS — R55 SYNCOPE, UNSPECIFIED SYNCOPE TYPE: Primary | ICD-10-CM

## 2023-03-30 LAB
BACTERIA URNS QL MICRO: ABNORMAL /HPF
BILIRUB UR QL STRIP.AUTO: NEGATIVE
BILIRUBIN, POC: NORMAL
BLOOD URINE, POC: NORMAL
CLARITY UR: ABNORMAL
CLARITY, POC: NORMAL
COLOR UR: YELLOW
COLOR, POC: NORMAL
EPI CELLS #/AREA URNS AUTO: 7 /HPF (ref 0–5)
GLUCOSE UR STRIP.AUTO-MCNC: NEGATIVE MG/DL
GLUCOSE URINE, POC: NORMAL
HGB UR QL STRIP.AUTO: NEGATIVE
HYALINE CASTS #/AREA URNS AUTO: 12 /LPF (ref 0–8)
KETONES UR STRIP.AUTO-MCNC: ABNORMAL MG/DL
KETONES, POC: NORMAL
LEUKOCYTE EST, POC: NORMAL
LEUKOCYTE ESTERASE UR QL STRIP.AUTO: ABNORMAL
NITRITE UR QL STRIP.AUTO: NEGATIVE
NITRITE, POC: NORMAL
PH UR STRIP.AUTO: 6 [PH] (ref 5–8)
PH, POC: 6
PROT UR STRIP.AUTO-MCNC: ABNORMAL MG/DL
PROTEIN, POC: NORMAL
RBC CLUMPS #/AREA URNS AUTO: 1 /HPF (ref 0–4)
SP GR UR STRIP.AUTO: 1.02 (ref 1–1.03)
SPECIFIC GRAVITY, POC: 1.02
UA DIPSTICK W REFLEX MICRO PNL UR: YES
URN SPEC COLLECT METH UR: ABNORMAL
UROBILINOGEN UR STRIP-ACNC: 0.2 E.U./DL
UROBILINOGEN, POC: NORMAL
WBC #/AREA URNS AUTO: 19 /HPF (ref 0–5)

## 2023-03-30 PROCEDURE — 99214 OFFICE O/P EST MOD 30 MIN: CPT | Performed by: INTERNAL MEDICINE

## 2023-03-30 PROCEDURE — 1123F ACP DISCUSS/DSCN MKR DOCD: CPT | Performed by: INTERNAL MEDICINE

## 2023-03-30 PROCEDURE — 81002 URINALYSIS NONAUTO W/O SCOPE: CPT | Performed by: INTERNAL MEDICINE

## 2023-03-30 ASSESSMENT — ENCOUNTER SYMPTOMS
NAUSEA: 0
VISUAL CHANGE: 0
BOWEL INCONTINENCE: 0
VOMITING: 0
ABDOMINAL PAIN: 0
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
## 2023-03-31 RX ORDER — SULFAMETHOXAZOLE AND TRIMETHOPRIM 800; 160 MG/1; MG/1
1 TABLET ORAL 2 TIMES DAILY
Qty: 10 TABLET | Refills: 0 | Status: SHIPPED | OUTPATIENT
Start: 2023-03-31 | End: 2023-04-05

## 2023-03-31 NOTE — RESULT ENCOUNTER NOTE
Please let the patient know that results show that she may have a mild urinary tract infection I would like the patient to take a course of antibiotics, prescription sent to the pharmacy

## 2023-04-20 RX ORDER — ATORVASTATIN CALCIUM 80 MG/1
80 TABLET, FILM COATED ORAL DAILY
Qty: 30 TABLET | Refills: 0 | Status: SHIPPED | OUTPATIENT
Start: 2023-04-20

## 2023-04-20 RX ORDER — FUROSEMIDE 20 MG/1
TABLET ORAL
Qty: 60 TABLET | Refills: 0 | Status: SHIPPED | OUTPATIENT
Start: 2023-04-20

## 2023-04-20 RX ORDER — DICLOFENAC SODIUM 75 MG/1
TABLET, DELAYED RELEASE ORAL
Qty: 60 TABLET | Refills: 0 | Status: SHIPPED | OUTPATIENT
Start: 2023-04-20

## 2023-04-27 ENCOUNTER — TELEPHONE (OUTPATIENT)
Dept: INTERNAL MEDICINE CLINIC | Age: 88
End: 2023-04-27

## 2023-05-03 ENCOUNTER — TELEPHONE (OUTPATIENT)
Dept: CARDIOLOGY CLINIC | Age: 88
End: 2023-05-03

## 2023-05-03 ENCOUNTER — TELEPHONE (OUTPATIENT)
Dept: INTERNAL MEDICINE CLINIC | Age: 88
End: 2023-05-03

## 2023-05-09 ENCOUNTER — TELEPHONE (OUTPATIENT)
Dept: INTERNAL MEDICINE CLINIC | Age: 88
End: 2023-05-09

## 2023-05-09 RX ORDER — CLOPIDOGREL BISULFATE 75 MG/1
75 TABLET ORAL DAILY
Qty: 30 TABLET | Refills: 0 | COMMUNITY
Start: 2023-05-05 | End: 2023-05-09 | Stop reason: SDUPTHER

## 2023-05-09 RX ORDER — CLOPIDOGREL BISULFATE 75 MG/1
75 TABLET ORAL DAILY
Qty: 90 TABLET | Refills: 1 | Status: SHIPPED | OUTPATIENT
Start: 2023-05-09 | End: 2023-06-08

## 2023-05-09 NOTE — TELEPHONE ENCOUNTER
Francisca with [facility] called. They rec'd orders on patient for SN eval. Need to know if you will sign off on orders for patient. Patient does have an appt with Kamryn Addy for a hfu on Friday due to PCP not being available due to vacation.  Please advise

## 2023-05-09 NOTE — TELEPHONE ENCOUNTER
Called Francisca to advise. I also advised her she may want to wait for patient to come to the visit before starting the care to be sure we have a face to face from our office.

## 2023-05-09 NOTE — TELEPHONE ENCOUNTER
Got her in with Sherry on Friday but she needs Plavix called in today---she had a stroke and was in [place] they were suppose to call in but did not---please call in to [facility]. Thanks.

## 2023-05-09 NOTE — TELEPHONE ENCOUNTER
Pt needing hosp f/uu does not want to see anyone but him advised he was going on vacation end of may.  Please call and advise if sooner appt available

## 2023-05-09 NOTE — TELEPHONE ENCOUNTER
The patient get seen within the 30 days after the orders have been started and the face-to-face have been documented in the system I will sign the papers

## 2023-05-12 ENCOUNTER — OFFICE VISIT (OUTPATIENT)
Dept: INTERNAL MEDICINE CLINIC | Age: 88
End: 2023-05-12
Payer: MEDICARE

## 2023-05-12 VITALS
BODY MASS INDEX: 27.56 KG/M2 | DIASTOLIC BLOOD PRESSURE: 64 MMHG | HEIGHT: 65 IN | OXYGEN SATURATION: 98 % | SYSTOLIC BLOOD PRESSURE: 120 MMHG | WEIGHT: 165.4 LBS | HEART RATE: 52 BPM

## 2023-05-12 DIAGNOSIS — E06.3 HYPOTHYROIDISM DUE TO HASHIMOTO'S THYROIDITIS: ICD-10-CM

## 2023-05-12 DIAGNOSIS — E87.6 HYPOKALEMIA: ICD-10-CM

## 2023-05-12 DIAGNOSIS — Z09 HOSPITAL DISCHARGE FOLLOW-UP: Primary | ICD-10-CM

## 2023-05-12 DIAGNOSIS — R47.01 APHASIA: ICD-10-CM

## 2023-05-12 DIAGNOSIS — I15.0 RENOVASCULAR HYPERTENSION: ICD-10-CM

## 2023-05-12 DIAGNOSIS — E83.42 HYPOMAGNESEMIA: ICD-10-CM

## 2023-05-12 DIAGNOSIS — N18.31 STAGE 3A CHRONIC KIDNEY DISEASE (HCC): ICD-10-CM

## 2023-05-12 DIAGNOSIS — I63.9 ISCHEMIC STROKE (HCC): ICD-10-CM

## 2023-05-12 DIAGNOSIS — E03.8 HYPOTHYROIDISM DUE TO HASHIMOTO'S THYROIDITIS: ICD-10-CM

## 2023-05-12 DIAGNOSIS — E87.1 HYPONATREMIA: ICD-10-CM

## 2023-05-12 PROCEDURE — 99214 OFFICE O/P EST MOD 30 MIN: CPT | Performed by: NURSE PRACTITIONER

## 2023-05-12 PROCEDURE — 1123F ACP DISCUSS/DSCN MKR DOCD: CPT | Performed by: NURSE PRACTITIONER

## 2023-05-16 ENCOUNTER — TELEPHONE (OUTPATIENT)
Dept: INTERNAL MEDICINE CLINIC | Age: 88
End: 2023-05-16

## 2023-05-16 DIAGNOSIS — E87.1 CHRONIC HYPONATREMIA: Primary | ICD-10-CM

## 2023-05-16 RX ORDER — SODIUM CHLORIDE 1 G/1
1 TABLET ORAL 3 TIMES DAILY
Qty: 90 TABLET | Refills: 3 | Status: SHIPPED | OUTPATIENT
Start: 2023-05-16 | End: 2023-06-29

## 2023-05-16 RX ORDER — LANOLIN ALCOHOL/MO/W.PET/CERES
400 CREAM (GRAM) TOPICAL DAILY
Qty: 30 TABLET | Refills: 0 | Status: SHIPPED | OUTPATIENT
Start: 2023-05-16

## 2023-05-16 RX ORDER — LANOLIN ALCOHOL/MO/W.PET/CERES
400 CREAM (GRAM) TOPICAL DAILY
COMMUNITY
End: 2023-05-16 | Stop reason: SDUPTHER

## 2023-05-22 ASSESSMENT — ENCOUNTER SYMPTOMS
RESPIRATORY NEGATIVE: 1
GASTROINTESTINAL NEGATIVE: 1

## 2023-06-29 ENCOUNTER — OFFICE VISIT (OUTPATIENT)
Dept: CARDIOLOGY CLINIC | Age: 88
End: 2023-06-29
Payer: MEDICARE

## 2023-06-29 VITALS
HEART RATE: 72 BPM | OXYGEN SATURATION: 96 % | BODY MASS INDEX: 26.99 KG/M2 | DIASTOLIC BLOOD PRESSURE: 70 MMHG | HEIGHT: 65 IN | WEIGHT: 162 LBS | SYSTOLIC BLOOD PRESSURE: 112 MMHG

## 2023-06-29 DIAGNOSIS — E78.49 OTHER HYPERLIPIDEMIA: ICD-10-CM

## 2023-06-29 DIAGNOSIS — G45.9 TIA (TRANSIENT ISCHEMIC ATTACK): ICD-10-CM

## 2023-06-29 DIAGNOSIS — E53.8 LOW VITAMIN B12 LEVEL: ICD-10-CM

## 2023-06-29 DIAGNOSIS — I10 HYPERTENSION, UNSPECIFIED TYPE: Primary | ICD-10-CM

## 2023-06-29 PROCEDURE — 1123F ACP DISCUSS/DSCN MKR DOCD: CPT | Performed by: INTERNAL MEDICINE

## 2023-06-29 PROCEDURE — 99214 OFFICE O/P EST MOD 30 MIN: CPT | Performed by: INTERNAL MEDICINE

## 2023-07-05 ENCOUNTER — TELEPHONE (OUTPATIENT)
Dept: INTERNAL MEDICINE CLINIC | Age: 88
End: 2023-07-05

## 2023-07-05 NOTE — TELEPHONE ENCOUNTER
Kamryn Arzola from [facility] called in regards to needing refills of the medications listed below. atorvastatin (LIPITOR) 80 MG tablet  levothyroxine (SYNTHROID) 50 MCG tablet  furosemide (LASIX) 20 MG tablet  If any questions or concerns please call the pharamcy.

## 2023-07-06 RX ORDER — CITALOPRAM 20 MG/1
20 TABLET ORAL DAILY
Qty: 30 TABLET | Refills: 2 | Status: SHIPPED | OUTPATIENT
Start: 2023-07-06

## 2023-07-06 RX ORDER — FUROSEMIDE 20 MG/1
TABLET ORAL
Qty: 180 TABLET | Refills: 0 | Status: SHIPPED | OUTPATIENT
Start: 2023-07-06

## 2023-07-06 RX ORDER — ATORVASTATIN CALCIUM 80 MG/1
80 TABLET, FILM COATED ORAL DAILY
Qty: 90 TABLET | Refills: 0 | Status: SHIPPED | OUTPATIENT
Start: 2023-07-06

## 2023-07-06 RX ORDER — LEVOTHYROXINE SODIUM 0.05 MG/1
50 TABLET ORAL DAILY
Qty: 90 TABLET | Refills: 1 | Status: SHIPPED | OUTPATIENT
Start: 2023-07-06

## 2023-07-06 NOTE — PROGRESS NOTES
Last OV: 5/12/2023  Next OV: 8/11/2023    Next appointment due:(around 8/12/2023    Last fill:3/28/23  Refills:2

## 2023-07-14 ENCOUNTER — TELEPHONE (OUTPATIENT)
Dept: INTERNAL MEDICINE CLINIC | Age: 88
End: 2023-07-14
Payer: MEDICARE

## 2023-07-14 DIAGNOSIS — D47.2 MGUS (MONOCLONAL GAMMOPATHY OF UNKNOWN SIGNIFICANCE): ICD-10-CM

## 2023-07-14 DIAGNOSIS — M15.9 PRIMARY OSTEOARTHRITIS INVOLVING MULTIPLE JOINTS: Primary | ICD-10-CM

## 2023-07-14 DIAGNOSIS — K59.04 FUNCTIONAL CONSTIPATION: ICD-10-CM

## 2023-07-14 DIAGNOSIS — G45.9 TIA (TRANSIENT ISCHEMIC ATTACK): ICD-10-CM

## 2023-07-14 DIAGNOSIS — I67.9 CVD (CEREBROVASCULAR DISEASE): ICD-10-CM

## 2023-07-14 PROCEDURE — G0180 MD CERTIFICATION HHA PATIENT: HCPCS | Performed by: INTERNAL MEDICINE

## 2023-07-20 DIAGNOSIS — I10 HYPERTENSION, UNSPECIFIED TYPE: ICD-10-CM

## 2023-07-20 DIAGNOSIS — E53.8 LOW VITAMIN B12 LEVEL: ICD-10-CM

## 2023-07-20 LAB
ANION GAP SERPL CALCULATED.3IONS-SCNC: 12 MMOL/L (ref 3–16)
BUN SERPL-MCNC: 12 MG/DL (ref 7–20)
CALCIUM SERPL-MCNC: 9.4 MG/DL (ref 8.3–10.6)
CHLORIDE SERPL-SCNC: 98 MMOL/L (ref 99–110)
CO2 SERPL-SCNC: 27 MMOL/L (ref 21–32)
CREAT SERPL-MCNC: 0.8 MG/DL (ref 0.6–1.2)
FOLATE SERPL-MCNC: 18.22 NG/ML (ref 4.78–24.2)
GFR SERPLBLD CREATININE-BSD FMLA CKD-EPI: >60 ML/MIN/{1.73_M2}
GLUCOSE SERPL-MCNC: 76 MG/DL (ref 70–99)
POTASSIUM SERPL-SCNC: 4.2 MMOL/L (ref 3.5–5.1)
SODIUM SERPL-SCNC: 137 MMOL/L (ref 136–145)
VIT B12 SERPL-MCNC: 1146 PG/ML (ref 211–911)

## 2023-07-21 ENCOUNTER — TELEPHONE (OUTPATIENT)
Dept: CARDIOLOGY CLINIC | Age: 88
End: 2023-07-21

## 2023-07-21 NOTE — TELEPHONE ENCOUNTER
FYI, Wanted to let [facility] know pt is admitted to [facility] with mini stroke symptoms.

## 2023-07-24 ENCOUNTER — TELEPHONE (OUTPATIENT)
Dept: CARDIOLOGY CLINIC | Age: 88
End: 2023-07-24

## 2023-07-24 NOTE — TELEPHONE ENCOUNTER
Pt called requesting appt with [facility] after hospital appt in the [facility] office,  pt stated they had mini stroke and is wearing monitor.     Pls advise thank  you

## 2023-07-25 NOTE — TELEPHONE ENCOUNTER
Dr Vernon Morocho wants to see Art Aleman after 30 day monitor is finished and she states she should follow hospital instructions of switching Plavix and asa to Eliquis 5mg BID. I spoke w/ Art Aleman and relayed to her Dr Ribeiro Levo instructions, appt made for 8/29/23 at [address] [place] [address] in [facility].

## 2023-07-31 ENCOUNTER — TELEPHONE (OUTPATIENT)
Dept: CARDIOLOGY CLINIC | Age: 88
End: 2023-07-31

## 2023-07-31 RX ORDER — ERGOCALCIFEROL 1.25 MG/1
CAPSULE ORAL
Qty: 5 CAPSULE | Refills: 0 | Status: SHIPPED | OUTPATIENT
Start: 2023-07-31

## 2023-07-31 RX ORDER — LOSARTAN POTASSIUM 25 MG/1
25 TABLET ORAL DAILY
Qty: 30 TABLET | Refills: 0 | Status: SHIPPED | OUTPATIENT
Start: 2023-07-31

## 2023-07-31 NOTE — TELEPHONE ENCOUNTER
[facility] called in regards to getting refills of   losartan (COZAAR) 25 MG tablet  vitamin D (ERGOCALCIFEROL) 1.25 MG (27269 UT) CAPS capsule  If any questions or concerns please call Pt or Pharmacy.

## 2023-07-31 NOTE — TELEPHONE ENCOUNTER
I spoke w/ Melba Bartlett and relayed instructions to her. She verbalized understanding and knows to decrease lasix to 20mg daily if edema resolves.

## 2023-07-31 NOTE — TELEPHONE ENCOUNTER
She can stay on the 40 mg daily until her appt but go back to 20 when the swelling is resolved. She may need more pills.

## 2023-07-31 NOTE — TELEPHONE ENCOUNTER
Last OV: 5/12/2023  Next OV: 8/11/2023    Next appointment due:around 8/12/2023    Last fill:12/18/22  Refills:0 Out of season

## 2023-08-11 ENCOUNTER — OFFICE VISIT (OUTPATIENT)
Dept: INTERNAL MEDICINE CLINIC | Age: 88
End: 2023-08-11
Payer: MEDICARE

## 2023-08-11 VITALS
SYSTOLIC BLOOD PRESSURE: 136 MMHG | WEIGHT: 168 LBS | HEART RATE: 70 BPM | DIASTOLIC BLOOD PRESSURE: 66 MMHG | BODY MASS INDEX: 27.96 KG/M2 | OXYGEN SATURATION: 93 %

## 2023-08-11 DIAGNOSIS — G45.9 TIA (TRANSIENT ISCHEMIC ATTACK): ICD-10-CM

## 2023-08-11 DIAGNOSIS — N18.31 STAGE 3A CHRONIC KIDNEY DISEASE (HCC): ICD-10-CM

## 2023-08-11 DIAGNOSIS — Z00.00 INITIAL MEDICARE ANNUAL WELLNESS VISIT: Primary | ICD-10-CM

## 2023-08-11 DIAGNOSIS — I67.9 CVD (CEREBROVASCULAR DISEASE): ICD-10-CM

## 2023-08-11 DIAGNOSIS — Z00.00 WELCOME TO MEDICARE PREVENTIVE VISIT: ICD-10-CM

## 2023-08-11 DIAGNOSIS — I10 HYPERTENSION, UNSPECIFIED TYPE: Primary | ICD-10-CM

## 2023-08-11 PROCEDURE — 1123F ACP DISCUSS/DSCN MKR DOCD: CPT | Performed by: INTERNAL MEDICINE

## 2023-08-11 PROCEDURE — 99214 OFFICE O/P EST MOD 30 MIN: CPT | Performed by: INTERNAL MEDICINE

## 2023-08-11 PROCEDURE — G0402 INITIAL PREVENTIVE EXAM: HCPCS | Performed by: INTERNAL MEDICINE

## 2023-08-11 RX ORDER — AMLODIPINE BESYLATE 10 MG/1
10 TABLET ORAL DAILY
COMMUNITY
End: 2023-08-11 | Stop reason: SDUPTHER

## 2023-08-11 RX ORDER — LOSARTAN POTASSIUM 50 MG/1
50 TABLET ORAL DAILY
Qty: 90 TABLET | Refills: 1 | Status: SHIPPED | OUTPATIENT
Start: 2023-08-11

## 2023-08-11 RX ORDER — AMLODIPINE BESYLATE 5 MG/1
5 TABLET ORAL DAILY
Qty: 30 TABLET | Refills: 0
Start: 2023-08-11

## 2023-08-11 RX ORDER — ACETAMINOPHEN 325 MG/1
650 TABLET ORAL EVERY 6 HOURS PRN
COMMUNITY

## 2023-08-11 RX ORDER — SENNOSIDES A AND B 8.6 MG/1
1 TABLET, FILM COATED ORAL PRN
COMMUNITY

## 2023-08-11 SDOH — ECONOMIC STABILITY: FOOD INSECURITY: WITHIN THE PAST 12 MONTHS, THE FOOD YOU BOUGHT JUST DIDN'T LAST AND YOU DIDN'T HAVE MONEY TO GET MORE.: NEVER TRUE

## 2023-08-11 SDOH — ECONOMIC STABILITY: INCOME INSECURITY: HOW HARD IS IT FOR YOU TO PAY FOR THE VERY BASICS LIKE FOOD, HOUSING, MEDICAL CARE, AND HEATING?: NOT HARD AT ALL

## 2023-08-11 SDOH — ECONOMIC STABILITY: FOOD INSECURITY: WITHIN THE PAST 12 MONTHS, YOU WORRIED THAT YOUR FOOD WOULD RUN OUT BEFORE YOU GOT MONEY TO BUY MORE.: NEVER TRUE

## 2023-08-11 ASSESSMENT — LIFESTYLE VARIABLES
HOW MANY STANDARD DRINKS CONTAINING ALCOHOL DO YOU HAVE ON A TYPICAL DAY: PATIENT DOES NOT DRINK
HOW OFTEN DO YOU HAVE A DRINK CONTAINING ALCOHOL: NEVER

## 2023-08-11 ASSESSMENT — PATIENT HEALTH QUESTIONNAIRE - PHQ9
SUM OF ALL RESPONSES TO PHQ QUESTIONS 1-9: 0
SUM OF ALL RESPONSES TO PHQ9 QUESTIONS 1 & 2: 0
2. FEELING DOWN, DEPRESSED OR HOPELESS: 0
1. LITTLE INTEREST OR PLEASURE IN DOING THINGS: 0
SUM OF ALL RESPONSES TO PHQ QUESTIONS 1-9: 0

## 2023-08-11 ASSESSMENT — ENCOUNTER SYMPTOMS
ABDOMINAL PAIN: 0
SWOLLEN GLANDS: 0
BLURRED VISION: 0
SHORTNESS OF BREATH: 0
SORE THROAT: 0

## 2023-08-11 NOTE — PATIENT INSTRUCTIONS
provider may have ordered immunizations, labs, imaging, and/or referrals for you. A list of these orders (if applicable) as well as your Preventive Care list are included within your After Visit Summary for your review. Other Preventive Recommendations:    A preventive eye exam performed by an eye specialist is recommended every 1-2 years to screen for glaucoma; cataracts, macular degeneration, and other eye disorders. A preventive dental visit is recommended every 6 months. Try to get at least 150 minutes of exercise per week or 10,000 steps per day on a pedometer . Order or download the FREE \"Exercise & Physical Activity: Your Everyday Guide\" from [employer]. Call 7-357.801.6100 or search [employer] online. You need 3305-7492 mg of calcium and 6983-8041 IU of vitamin D per day. It is possible to meet your calcium requirement with diet alone, but a vitamin D supplement is usually necessary to meet this goal.  When exposed to the sun, use a sunscreen that protects against both UVA and UVB radiation with an SPF of 30 or greater. Reapply every 2 to 3 hours or after sweating, drying off with a towel, or swimming. Always wear a seat belt when traveling in a car. Always wear a helmet when riding a bicycle or motorcycle.

## 2023-08-11 NOTE — PROGRESS NOTES
Carmella Dalton (:  1934) is a 80 y.o. female,Established patient, here for evaluation of the following chief complaint(s):  3 Month Follow-Up         ASSESSMENT/PLAN:  1. Hypertension, unspecified type  -     amLODIPine (NORVASC) 5 MG tablet; Take 1 tablet by mouth daily, Disp-30 tablet, R-0NO PRINT  -     losartan (COZAAR) 50 MG tablet; Take 1 tablet by mouth daily, Disp-90 tablet, R-1Normal  -     Basic Metabolic Panel; Future  2. TIA (transient ischemic attack)  3. Stage 3a chronic kidney disease ([address])  -     Basic Metabolic Panel; Future  4. CVD (cerebrovascular disease)  Patient had most recent hospitalization the patient did have another TIA during her hospitalization apparently she was found to have A-fib her risk profile was very high and she was started on anticoagulation and she is aware of the risks associated with it and the benefits as well    The patient was also started on antihypertensive medication specifically amlodipine after which she had significant lower extremity swelling and her weight increase in 1 day about 4 pounds at this point we will get a try and reduce her amlodipine from 10 mg to 5 mg and increase her losartan from 25 mg to 50 mg and check her kidney function and chemistry in about 1 week after this change takes place    No follow-ups on file.          Subjective   SUBJECTIVE/OBJECTIVE:    Lab Review   Lab Results   Component Value Date/Time     2023 10:22 AM     2023 10:53 AM    K 4.2 2023 10:22 AM    K 4.8 2023 10:53 AM    CO2 27 2023 10:22 AM    CO2 24 2023 10:53 AM    BUN 12 2023 10:22 AM    BUN 20 2023 10:53 AM    CREATININE 0.8 2023 10:22 AM    CREATININE 0.7 2023 10:53 AM    GLUCOSE 76 2023 10:22 AM    GLUCOSE 95 2023 10:53 AM    CALCIUM 9.4 2023 10:22 AM    CALCIUM 9.3 2023 10:53 AM     Lab Results   Component Value Date/Time    WBC 6.3 2023 10:53 AM    HGB 10.8

## 2023-08-11 NOTE — PROGRESS NOTES
height or weight on file to calculate BMI. Wt Readings from Last 3 Encounters:   08/11/23 168 lb (76.2 kg)   06/29/23 162 lb (73.5 kg)   05/12/23 165 lb 6.4 oz (75 kg)     Temp Readings from Last 3 Encounters:   No data found for Temp     BP Readings from Last 3 Encounters:   08/11/23 136/66   06/29/23 112/70   05/12/23 120/64     Pulse Readings from Last 3 Encounters:   08/11/23 70   06/29/23 72   05/12/23 52                No Known Allergies  Prior to Visit Medications    Medication Sig Taking?  Authorizing Provider   Multiple Vitamin (MULTIVITAMIN ADULT PO) Take 1 tablet by mouth daily  Historical Provider, MD   senna (SENOKOT) 8.6 MG tablet Take 1 tablet by mouth as needed for Constipation  Historical Provider, MD   acetaminophen (TYLENOL) 325 MG tablet Take 2 tablets by mouth every 6 hours as needed for Pain  Historical Provider, MD   amLODIPine (NORVASC) 5 MG tablet Take 1 tablet by mouth daily  Mary Alas MD   losartan (COZAAR) 50 MG tablet Take 1 tablet by mouth daily  Mary Alas MD   vitamin D (ERGOCALCIFEROL) 1.25 MG (06110 UT) CAPS capsule TAKE ONE CAPSULE BY MOUTH Every week on sunday  Mary Alas MD   apixaban (ELIQUIS) 5 MG TABS tablet Take 1 tablet by mouth 2 times daily  Richard Garcia MD   atorvastatin (LIPITOR) 80 MG tablet Take 1 tablet by mouth daily  Mary Alas MD   levothyroxine (SYNTHROID) 50 MCG tablet Take 1 tablet by mouth Daily  Mary Alas MD   furosemide (LASIX) 20 MG tablet TAKE ONE TABLET BY MOUTH EVERY DAY  Mary Alas MD   citalopram (CELEXA) 20 MG tablet Take 1 tablet by mouth daily  Mary Alas MD   Potassium 99 MG TABS Take by mouth  Historical Provider, MD   folic acid (FOLVITE) 511 MCG tablet Take 1 tablet by mouth daily  Mary Alas MD   diclofenac (VOLTAREN) 75 MG EC tablet TAKE ONE TABLET BY MOUTH EVERY DAY  Alexander Day MD   albuterol sulfate HFA (VENTOLIN HFA) 108 (90 Base) MCG/ACT inhaler Inhale 2 puffs into the lungs 4 times daily as

## 2023-08-21 ENCOUNTER — TELEPHONE (OUTPATIENT)
Dept: CARDIOLOGY CLINIC | Age: 88
End: 2023-08-21

## 2023-08-21 DIAGNOSIS — I10 HYPERTENSION, UNSPECIFIED TYPE: ICD-10-CM

## 2023-08-21 DIAGNOSIS — N18.31 STAGE 3A CHRONIC KIDNEY DISEASE (HCC): ICD-10-CM

## 2023-08-21 LAB
ANION GAP SERPL CALCULATED.3IONS-SCNC: 11 MMOL/L (ref 3–16)
BUN SERPL-MCNC: 12 MG/DL (ref 7–20)
CALCIUM SERPL-MCNC: 9.4 MG/DL (ref 8.3–10.6)
CHLORIDE SERPL-SCNC: 98 MMOL/L (ref 99–110)
CO2 SERPL-SCNC: 29 MMOL/L (ref 21–32)
CREAT SERPL-MCNC: 0.8 MG/DL (ref 0.6–1.2)
GFR SERPLBLD CREATININE-BSD FMLA CKD-EPI: >60 ML/MIN/{1.73_M2}
GLUCOSE SERPL-MCNC: 87 MG/DL (ref 70–99)
POTASSIUM SERPL-SCNC: 4.8 MMOL/L (ref 3.5–5.1)
SODIUM SERPL-SCNC: 138 MMOL/L (ref 136–145)

## 2023-08-21 NOTE — TELEPHONE ENCOUNTER
Medication Refill    Medication needing refilled:  apixaban (ELIQUIS) 5 MG    Dosage of the medication:    How are you taking this medication (QD, BID, TID, QID, PRN):1 tablet by mouth 2 times daily    30 or 90 day supply called in:    When will you run out of your medication:    Which Pharmacy are we sending the medication to?:   [facility]  [phone] fax [phone]

## 2023-08-21 NOTE — PROGRESS NOTES
[facility]   Cardiac Evaluation      Patient: Jared Champion  YOB: 1934         Chief Complaint   Patient presents with    Hypertension    Hyperlipidemia        Referring provider: Pb Robles MD    History of Present Illness:    Ms Darius Garcia is seen following hospitalization. She was initially seen as a self referral after hospitalization 10/3-10/4/22. Francisco Javier Katz was hospitalized with expressive aphasia and hand numbness for 2 hours prior to hospital arrival. Her symptoms resolved. Carotid doppler revealed <50% stenosis bilaterally. She had an echo with EF 55%, moderate AR, mild MR. She was started on ASA and Lipitor 80mg daily. She had previously been taken off Simvastatin by her PMD because of her age that she took for 40+ years. She has a strong family history of early heart disease in her mother and aunts. Ms Darius Garcia was hospitalized 5/2/23 with aphasia. She had undergone fractured pelvic surgery on 4/7/23 and was in a nursing facility for rehab. MRI of the brain showed small-moderate sized acute infarct along the left central sulcus and subcortical region. She was taking Plavix and Lipitor 80mg daily. During this hospitalization she was started on sodium chloride tabs. Prior to this event Ms Mujica Cool 60 yo son suddenly passed away from angioedema. She was hospitalized 7/21/23 with TIA. Hospital notes say she had atrial fibrillation on telemetry. Plavix and asa were changed to Eliquis 5mg BID. She was discharged with 30 day cardiac monitor. Echo was done that revealed EF 55-60%. All the EKGs from the hospital show SR. Today, Francisco Javier Katz states she has been feeling fine since this last hospitalization. The day she went to the hospital she had developed slurred speech and did not feel well. Francisco Javier Katz denies chest pain, palpitations, CHRISTIE. She has developed LE edema, so pcp decreased Amlodipine to 5mg daily.     Past Medical History:   has a past medical history of Anxiety,

## 2023-08-28 DIAGNOSIS — I10 HYPERTENSION, UNSPECIFIED TYPE: ICD-10-CM

## 2023-08-28 RX ORDER — DICLOFENAC SODIUM 75 MG/1
TABLET, DELAYED RELEASE ORAL
Qty: 60 TABLET | Refills: 0 | Status: SHIPPED | OUTPATIENT
Start: 2023-08-28

## 2023-08-28 RX ORDER — AMLODIPINE BESYLATE 5 MG/1
5 TABLET ORAL DAILY
Qty: 30 TABLET | Refills: 0 | Status: SHIPPED | OUTPATIENT
Start: 2023-08-28 | End: 2023-08-29

## 2023-08-28 RX ORDER — ERGOCALCIFEROL 1.25 MG/1
CAPSULE ORAL
Qty: 5 CAPSULE | Refills: 0 | Status: SHIPPED | OUTPATIENT
Start: 2023-08-28

## 2023-08-28 NOTE — TELEPHONE ENCOUNTER
Pharmacy calling requesting refill of   - vitamin D (ERGOCALCIFEROL) 1.25 MG (39800 UT) CAPS capsule  - diclofenac (VOLTAREN) 75 MG EC tablet  - amLODIPine (NORVASC) 5 MG tablet (did not receive on 8/11)    Last OV 8/11/23  Next OV 11/13/23    [facility] [address] [facility].

## 2023-08-28 NOTE — TELEPHONE ENCOUNTER
Last OV: 8/11/2023  Next OV: 11/13/2023    Next appointment due:Return in about 3 months (around 11/11/2023).     Last fill:4/20/2023  Refills:0

## 2023-08-29 ENCOUNTER — OFFICE VISIT (OUTPATIENT)
Dept: CARDIOLOGY CLINIC | Age: 88
End: 2023-08-29
Payer: MEDICARE

## 2023-08-29 VITALS
HEIGHT: 65 IN | DIASTOLIC BLOOD PRESSURE: 60 MMHG | HEART RATE: 63 BPM | SYSTOLIC BLOOD PRESSURE: 118 MMHG | BODY MASS INDEX: 27.99 KG/M2 | WEIGHT: 168 LBS | OXYGEN SATURATION: 98 %

## 2023-08-29 DIAGNOSIS — G45.9 TIA (TRANSIENT ISCHEMIC ATTACK): ICD-10-CM

## 2023-08-29 DIAGNOSIS — E78.49 OTHER HYPERLIPIDEMIA: ICD-10-CM

## 2023-08-29 DIAGNOSIS — I48.0 PAROXYSMAL ATRIAL FIBRILLATION (HCC): ICD-10-CM

## 2023-08-29 DIAGNOSIS — I10 HYPERTENSION, UNSPECIFIED TYPE: Primary | ICD-10-CM

## 2023-08-29 PROCEDURE — 99214 OFFICE O/P EST MOD 30 MIN: CPT | Performed by: INTERNAL MEDICINE

## 2023-08-29 PROCEDURE — 1123F ACP DISCUSS/DSCN MKR DOCD: CPT | Performed by: INTERNAL MEDICINE

## 2023-08-29 PROCEDURE — 93000 ELECTROCARDIOGRAM COMPLETE: CPT | Performed by: INTERNAL MEDICINE

## 2023-08-29 RX ORDER — LOSARTAN POTASSIUM 100 MG/1
100 TABLET ORAL DAILY
Qty: 90 TABLET | Refills: 3 | Status: SHIPPED | OUTPATIENT
Start: 2023-08-29

## 2023-09-29 ENCOUNTER — TELEPHONE (OUTPATIENT)
Dept: INTERNAL MEDICINE CLINIC | Age: 88
End: 2023-09-29

## 2023-09-29 RX ORDER — ATORVASTATIN CALCIUM 80 MG/1
80 TABLET, FILM COATED ORAL DAILY
Qty: 90 TABLET | Refills: 1 | Status: SHIPPED | OUTPATIENT
Start: 2023-09-29

## 2023-10-26 RX ORDER — CITALOPRAM 20 MG/1
20 TABLET ORAL DAILY
Qty: 30 TABLET | Refills: 0 | Status: SHIPPED | OUTPATIENT
Start: 2023-10-26

## 2023-10-26 RX ORDER — DICLOFENAC SODIUM 75 MG/1
TABLET, DELAYED RELEASE ORAL
Qty: 60 TABLET | Refills: 0 | Status: SHIPPED | OUTPATIENT
Start: 2023-10-26

## 2023-10-26 RX ORDER — ERGOCALCIFEROL 1.25 MG/1
CAPSULE ORAL
Qty: 5 CAPSULE | Refills: 0 | Status: SHIPPED | OUTPATIENT
Start: 2023-10-26

## 2023-10-26 NOTE — PROGRESS NOTES
Last OV: 8/11/2023  Next OV: 11/13/2023    Next appointment due:around 11/11/2023    Last fill:8/28/23  Refills:0

## 2023-11-13 ENCOUNTER — OFFICE VISIT (OUTPATIENT)
Dept: INTERNAL MEDICINE CLINIC | Age: 88
End: 2023-11-13

## 2023-11-13 VITALS
HEART RATE: 66 BPM | OXYGEN SATURATION: 97 % | SYSTOLIC BLOOD PRESSURE: 128 MMHG | WEIGHT: 170.4 LBS | BODY MASS INDEX: 28.36 KG/M2 | DIASTOLIC BLOOD PRESSURE: 64 MMHG

## 2023-11-13 DIAGNOSIS — I10 PRIMARY HYPERTENSION: Primary | ICD-10-CM

## 2023-11-13 DIAGNOSIS — I15.0 RENOVASCULAR HYPERTENSION: ICD-10-CM

## 2023-11-13 DIAGNOSIS — E78.49 OTHER HYPERLIPIDEMIA: ICD-10-CM

## 2023-11-13 DIAGNOSIS — N18.31 STAGE 3A CHRONIC KIDNEY DISEASE (HCC): ICD-10-CM

## 2023-11-13 DIAGNOSIS — E03.8 HYPOTHYROIDISM DUE TO HASHIMOTO'S THYROIDITIS: ICD-10-CM

## 2023-11-13 DIAGNOSIS — E53.8 B12 DEFICIENCY: ICD-10-CM

## 2023-11-13 DIAGNOSIS — Z23 NEEDS FLU SHOT: ICD-10-CM

## 2023-11-13 DIAGNOSIS — E78.2 MIXED HYPERLIPIDEMIA: ICD-10-CM

## 2023-11-13 DIAGNOSIS — F41.9 ANXIETY: ICD-10-CM

## 2023-11-13 DIAGNOSIS — E87.5 HYPERKALEMIA: ICD-10-CM

## 2023-11-13 DIAGNOSIS — I48.0 PAROXYSMAL ATRIAL FIBRILLATION (HCC): ICD-10-CM

## 2023-11-13 DIAGNOSIS — E06.3 HYPOTHYROIDISM DUE TO HASHIMOTO'S THYROIDITIS: ICD-10-CM

## 2023-11-13 DIAGNOSIS — I10 PRIMARY HYPERTENSION: ICD-10-CM

## 2023-11-13 DIAGNOSIS — D68.69 SECONDARY HYPERCOAGULABLE STATE (HCC): ICD-10-CM

## 2023-11-13 LAB
BASOPHILS # BLD: 0.1 K/UL (ref 0–0.2)
BASOPHILS NFR BLD: 0.8 %
DEPRECATED RDW RBC AUTO: 13.7 % (ref 12.4–15.4)
EOSINOPHIL # BLD: 0.2 K/UL (ref 0–0.6)
EOSINOPHIL NFR BLD: 2.8 %
FOLATE SERPL-MCNC: >20 NG/ML (ref 4.78–24.2)
HCT VFR BLD AUTO: 32.5 % (ref 36–48)
HGB BLD-MCNC: 11.5 G/DL (ref 12–16)
LYMPHOCYTES # BLD: 1.5 K/UL (ref 1–5.1)
LYMPHOCYTES NFR BLD: 22.7 %
MCH RBC QN AUTO: 35 PG (ref 26–34)
MCHC RBC AUTO-ENTMCNC: 35.3 G/DL (ref 31–36)
MCV RBC AUTO: 99.1 FL (ref 80–100)
MONOCYTES # BLD: 0.7 K/UL (ref 0–1.3)
MONOCYTES NFR BLD: 10.4 %
NEUTROPHILS # BLD: 4.3 K/UL (ref 1.7–7.7)
NEUTROPHILS NFR BLD: 63.3 %
PLATELET # BLD AUTO: 288 K/UL (ref 135–450)
PMV BLD AUTO: 7.9 FL (ref 5–10.5)
RBC # BLD AUTO: 3.28 M/UL (ref 4–5.2)
VIT B12 SERPL-MCNC: 521 PG/ML (ref 211–911)
WBC # BLD AUTO: 6.7 K/UL (ref 4–11)

## 2023-11-13 RX ORDER — CITALOPRAM 20 MG/1
20 TABLET ORAL DAILY
Qty: 90 TABLET | Refills: 1 | Status: SHIPPED | OUTPATIENT
Start: 2023-11-13

## 2023-11-13 RX ORDER — LEVOTHYROXINE SODIUM 0.05 MG/1
50 TABLET ORAL DAILY
Qty: 90 TABLET | Refills: 1 | Status: SHIPPED | OUTPATIENT
Start: 2023-11-13

## 2023-11-13 ASSESSMENT — ENCOUNTER SYMPTOMS
HOARSE VOICE: 0
VISUAL CHANGE: 0

## 2023-11-13 NOTE — PROGRESS NOTES
Jessica Pedraza (:  1934) is a 80 y.o. female,Established patient, here for evaluation of the following chief complaint(s):  Hypertension         ASSESSMENT/PLAN:  1. Primary hypertension  -     CBC with Auto Differential; Future  -     Basic Metabolic Panel; Future  2. Stage 3a chronic kidney disease ([address])  3. Renovascular hypertension  4. Paroxysmal atrial fibrillation (HCC)  5. Secondary hypercoagulable state ([address])  6. Hypothyroidism due to Hashimoto's thyroiditis  -     levothyroxine (SYNTHROID) 50 MCG tablet; Take 1 tablet by mouth Daily, Disp-90 tablet, R-1Normal  -     TSH; Future  7. Other hyperlipidemia  -     Lipid Panel; Future  8. Mixed hyperlipidemia  9. B12 deficiency  -     Vitamin B12 & Folate; Future  10. Needs flu shot  -     Influenza, FLUAD, (age 72 y+), IM, PF, 0.5 mL  11. Anxiety  -     citalopram (CELEXA) 20 MG tablet; Take 1 tablet by mouth daily, Disp-90 tablet, R-1Normal    Overall the patient is doing fairly well her blood pressure is well controlled we will then check her chemistry and kidney function    Patient thyroid function will be checked and will refill her prescription today    Patient still having constipation she is advised to increase her fiber intake as well as fluid intake and to stay as active as she can she is already using all different kinds of over-the-counter medications including MiraLAX senna and Dulcolax    Patient is in need of her flu vaccine today we also discussed the booster vaccination for COVID  No follow-ups on file.          Subjective   SUBJECTIVE/OBJECTIVE:    Lab Review   Lab Results   Component Value Date/Time     2023 11:04 AM     2023 10:22 AM     2023 10:53 AM    K 4.8 2023 11:04 AM    K 4.2 2023 10:22 AM    K 4.8 2023 10:53 AM    CO2 29 2023 11:04 AM    CO2 27 2023 10:22 AM    CO2 24 2023 10:53 AM    BUN 12 2023 11:04 AM    BUN 12 2023 10:22 AM    BUN 20

## 2023-11-14 LAB
ANION GAP SERPL CALCULATED.3IONS-SCNC: 12 MMOL/L (ref 3–16)
BUN SERPL-MCNC: 15 MG/DL (ref 7–20)
CALCIUM SERPL-MCNC: 9.3 MG/DL (ref 8.3–10.6)
CHLORIDE SERPL-SCNC: 96 MMOL/L (ref 99–110)
CHOLEST SERPL-MCNC: 138 MG/DL (ref 0–199)
CO2 SERPL-SCNC: 28 MMOL/L (ref 21–32)
CREAT SERPL-MCNC: 1.1 MG/DL (ref 0.6–1.2)
GFR SERPLBLD CREATININE-BSD FMLA CKD-EPI: 48 ML/MIN/{1.73_M2}
GLUCOSE SERPL-MCNC: 92 MG/DL (ref 70–99)
HDLC SERPL-MCNC: 60 MG/DL (ref 40–60)
LDLC SERPL CALC-MCNC: 46 MG/DL
POTASSIUM SERPL-SCNC: 5.8 MMOL/L (ref 3.5–5.1)
SODIUM SERPL-SCNC: 136 MMOL/L (ref 136–145)
TRIGL SERPL-MCNC: 159 MG/DL (ref 0–150)
TSH SERPL DL<=0.005 MIU/L-ACNC: 3.32 UIU/ML (ref 0.27–4.2)
VLDLC SERPL CALC-MCNC: 32 MG/DL

## 2023-11-14 NOTE — RESULT ENCOUNTER NOTE
Please let the patient know that results were acceptable with the exception of the potassium which is elevated please have the patient repeat it right away

## 2023-11-17 DIAGNOSIS — E87.5 HYPERKALEMIA: ICD-10-CM

## 2023-11-17 LAB
ANION GAP SERPL CALCULATED.3IONS-SCNC: 10 MMOL/L (ref 3–16)
BUN SERPL-MCNC: 15 MG/DL (ref 7–20)
CALCIUM SERPL-MCNC: 8.7 MG/DL (ref 8.3–10.6)
CHLORIDE SERPL-SCNC: 96 MMOL/L (ref 99–110)
CO2 SERPL-SCNC: 26 MMOL/L (ref 21–32)
CREAT SERPL-MCNC: 1.2 MG/DL (ref 0.6–1.2)
GFR SERPLBLD CREATININE-BSD FMLA CKD-EPI: 43 ML/MIN/{1.73_M2}
GLUCOSE SERPL-MCNC: 98 MG/DL (ref 70–99)
POTASSIUM SERPL-SCNC: 5.2 MMOL/L (ref 3.5–5.1)
SODIUM SERPL-SCNC: 132 MMOL/L (ref 136–145)

## 2023-12-01 ENCOUNTER — OFFICE VISIT (OUTPATIENT)
Age: 88
End: 2023-12-01

## 2023-12-01 VITALS
DIASTOLIC BLOOD PRESSURE: 65 MMHG | SYSTOLIC BLOOD PRESSURE: 131 MMHG | WEIGHT: 170 LBS | BODY MASS INDEX: 28.29 KG/M2 | TEMPERATURE: 98.1 F | HEART RATE: 78 BPM | OXYGEN SATURATION: 96 %

## 2023-12-01 DIAGNOSIS — R11.0 NAUSEA: Primary | ICD-10-CM

## 2023-12-01 DIAGNOSIS — N39.0 URINARY TRACT INFECTION WITHOUT HEMATURIA, SITE UNSPECIFIED: ICD-10-CM

## 2023-12-01 LAB
BILIRUBIN, POC: ABNORMAL
BLOOD URINE, POC: ABNORMAL
CLARITY, POC: CLEAR
COLOR, POC: ABNORMAL
GLUCOSE URINE, POC: ABNORMAL
KETONES, POC: ABNORMAL
LEUKOCYTE EST, POC: ABNORMAL
NITRITE, POC: ABNORMAL
PH, POC: 6.5
PROTEIN, POC: ABNORMAL
SPECIFIC GRAVITY, POC: 1.01
UROBILINOGEN, POC: 0.2

## 2023-12-01 RX ORDER — CEPHALEXIN 500 MG/1
500 CAPSULE ORAL 4 TIMES DAILY
Qty: 28 CAPSULE | Refills: 0 | Status: SHIPPED | OUTPATIENT
Start: 2023-12-01 | End: 2023-12-08

## 2023-12-01 ASSESSMENT — ENCOUNTER SYMPTOMS
CONSTIPATION: 0
SHORTNESS OF BREATH: 0
VOMITING: 0
ABDOMINAL PAIN: 0
COUGH: 0
TROUBLE SWALLOWING: 0
DIARRHEA: 0
BACK PAIN: 0
SORE THROAT: 0
NAUSEA: 1

## 2023-12-01 NOTE — PROGRESS NOTES
normal. No respiratory distress. Breath sounds: Normal breath sounds. Abdominal:      General: Abdomen is flat. Bowel sounds are normal. There is no distension. Palpations: Abdomen is soft. Tenderness: There is no abdominal tenderness. There is no right CVA tenderness or left CVA tenderness. Musculoskeletal:      Cervical back: Neck supple. No rigidity. Lymphadenopathy:      Cervical: No cervical adenopathy. Skin:     Findings: No rash. Neurological:      General: No focal deficit present. Mental Status: She is alert and oriented to person, place, and time. An electronic signature was used to authenticate this note.     --Johanna Mayer MD

## 2023-12-04 NOTE — PROGRESS NOTES
[address] [facility]   Cardiac Evaluation      Patient: Amberly Esteban  YOB: 1934         Chief Complaint   Patient presents with    Atrial Fibrillation    Hypertension    Hyperlipidemia        Referring provider: Brennan Che MD    History of Present Illness:    Ms Remy Navarro is seen for follow up. She was initially seen as a self referral after hospitalization 10/3-10/4/22. Sawyer Solitario was hospitalized with expressive aphasia and hand numbness for 2 hours prior to hospital arrival. Her symptoms resolved. Carotid doppler revealed <50% stenosis bilaterally. She had an echo with EF 55%, moderate AR, mild MR. She was started on ASA and Lipitor 80mg daily. She had previously been taken off Simvastatin by her PMD because of her age that she took for 40+ years. She has a strong family history of early heart disease in her mother and aunts. Ms Remy Navarro was hospitalized 5/2/23 with aphasia. She had undergone fractured pelvic surgery on 4/7/23 and was in a nursing facility for rehab. MRI of the brain showed small-moderate sized acute infarct along the left central sulcus and subcortical region. She was taking Plavix and Lipitor 80mg daily. During this hospitalization she was started on sodium chloride tabs. Prior to this event Ms Starkey Part 62 yo son suddenly passed away from angioedema. She was hospitalized 7/21/23 with TIA. Hospital notes say she had atrial fibrillation on telemetry. Plavix and asa were changed to Eliquis 5mg BID. She was discharged with 30 day cardiac monitor. Echo was done that revealed EF 55-60%. All the EKGs from the hospital show SR. Today, Sawyer Solitario states she is doing well. She denies chest pain, palpitations, CHRISTIE, dizziness, or edema. She has not had any atrial fibrillation. Fatimah's main complaint is watery diarrhea for 2 months. She states she has been constipated most of her life.  She is asking for a GI referral.     Past Medical History:   has a past medical

## 2023-12-07 ENCOUNTER — TELEPHONE (OUTPATIENT)
Dept: CARDIOLOGY CLINIC | Age: 88
End: 2023-12-07

## 2023-12-07 NOTE — TELEPHONE ENCOUNTER
LM that her PCP ordered all the labs Dr. Jeana Gill would need. No additional labs needed at this time.

## 2023-12-13 ENCOUNTER — OFFICE VISIT (OUTPATIENT)
Dept: CARDIOLOGY CLINIC | Age: 88
End: 2023-12-13
Payer: MEDICARE

## 2023-12-13 VITALS
HEART RATE: 62 BPM | BODY MASS INDEX: 26.36 KG/M2 | SYSTOLIC BLOOD PRESSURE: 132 MMHG | DIASTOLIC BLOOD PRESSURE: 60 MMHG | WEIGHT: 164 LBS | OXYGEN SATURATION: 100 % | HEIGHT: 66 IN

## 2023-12-13 DIAGNOSIS — E78.49 OTHER HYPERLIPIDEMIA: ICD-10-CM

## 2023-12-13 DIAGNOSIS — K59.00 CONSTIPATION, UNSPECIFIED CONSTIPATION TYPE: ICD-10-CM

## 2023-12-13 DIAGNOSIS — I48.0 PAROXYSMAL ATRIAL FIBRILLATION (HCC): ICD-10-CM

## 2023-12-13 DIAGNOSIS — G45.9 TIA (TRANSIENT ISCHEMIC ATTACK): ICD-10-CM

## 2023-12-13 DIAGNOSIS — I10 HYPERTENSION, UNSPECIFIED TYPE: Primary | ICD-10-CM

## 2023-12-13 PROCEDURE — 99214 OFFICE O/P EST MOD 30 MIN: CPT | Performed by: INTERNAL MEDICINE

## 2023-12-13 PROCEDURE — 1123F ACP DISCUSS/DSCN MKR DOCD: CPT | Performed by: INTERNAL MEDICINE

## 2023-12-26 RX ORDER — ERGOCALCIFEROL 1.25 MG/1
CAPSULE ORAL
Qty: 5 CAPSULE | Refills: 0 | Status: SHIPPED | OUTPATIENT
Start: 2023-12-26

## 2023-12-26 RX ORDER — DICLOFENAC SODIUM 75 MG/1
TABLET, DELAYED RELEASE ORAL
Qty: 60 TABLET | Refills: 0 | Status: SHIPPED | OUTPATIENT
Start: 2023-12-26

## 2023-12-26 RX ORDER — APIXABAN 5 MG/1
5 TABLET, FILM COATED ORAL 2 TIMES DAILY
Qty: 180 TABLET | Refills: 1 | Status: SHIPPED | OUTPATIENT
Start: 2023-12-26

## 2023-12-26 NOTE — TELEPHONE ENCOUNTER
Last OV: 11/13/2023  Next OV: 2/13/2024    Last fill: Diclofenac  #60  10/26/23               Vit D  #5              10/26/23  Refills: 0

## 2024-01-09 NOTE — PROGRESS NOTES
Patient __X_ reached   _____not reached-preop instructions left on voice mail_____________      DATE__1/22/24______ TIME__1300______ARRIVAL_1130  FEC________      Nothing to eat or drink after midnight the night before,except for what the prep instructions call for.If you do not have the instructions or do not understand them please contact your doctors office.    Follow any instructions your doctors office has given you including what medications to take the AM of your procedure and which ones to hold.You may use your inhalers - bring rescue inhalers with you DOS.If you take a long acting insulin the jignesh prior please cut the dose in half and take no diabetic medications that AM.Follow specific doctors office instructions regarding blood thinners and if they want you to hold and for how long. If you are on a blood thinner and have no instructions please contact the office and ask-CHECK ELIQUIS    Dress comfortably,bring your insurance card,picture ID,and a complete list of medications, including supplements.    You must have a responsible adult to stay with you during the procedure,drive you home and stay with you.    [facility] phone number [phone] for any questions.      OTHER INSTRUCTIONS(if applicable)____take losartan, levothyroxine am of procedure_____________________________________________________        VISITOR POLICY(subject to change)    Current visitor policy is 2 visitors per patient.No children allowed. Mask at discretion of facility.  Visiting hours are 8a-8p. Overnight visitors will be at the discretion of the nurse. All policies are subject to change.

## 2024-01-22 ENCOUNTER — ANESTHESIA (OUTPATIENT)
Dept: ENDOSCOPY | Age: 89
End: 2024-01-22
Payer: MEDICARE

## 2024-01-22 ENCOUNTER — HOSPITAL ENCOUNTER (OUTPATIENT)
Age: 89
Setting detail: OUTPATIENT SURGERY
Discharge: HOME | End: 2024-01-22
Attending: INTERNAL MEDICINE | Admitting: INTERNAL MEDICINE
Payer: MEDICARE

## 2024-01-22 ENCOUNTER — ANESTHESIA EVENT (OUTPATIENT)
Dept: ENDOSCOPY | Age: 89
End: 2024-01-22
Payer: MEDICARE

## 2024-01-22 VITALS
TEMPERATURE: 97.4 F | RESPIRATION RATE: 16 BRPM | DIASTOLIC BLOOD PRESSURE: 64 MMHG | SYSTOLIC BLOOD PRESSURE: 146 MMHG | BODY MASS INDEX: 26.03 KG/M2 | HEART RATE: 57 BPM | WEIGHT: 162 LBS | OXYGEN SATURATION: 100 % | HEIGHT: 66 IN

## 2024-01-22 DIAGNOSIS — R19.7 DIARRHEA, UNSPECIFIED TYPE: ICD-10-CM

## 2024-01-22 PROCEDURE — 2580000003 HC RX 258: Performed by: NURSE ANESTHETIST, CERTIFIED REGISTERED

## 2024-01-22 PROCEDURE — 88305 TISSUE EXAM BY PATHOLOGIST: CPT

## 2024-01-22 PROCEDURE — 3700000001 HC ADD 15 MINUTES (ANESTHESIA): Performed by: INTERNAL MEDICINE

## 2024-01-22 PROCEDURE — 7100000011 HC PHASE II RECOVERY - ADDTL 15 MIN: Performed by: INTERNAL MEDICINE

## 2024-01-22 PROCEDURE — 3609010300 HC COLONOSCOPY W/BIOPSY SINGLE/MULTIPLE: Performed by: INTERNAL MEDICINE

## 2024-01-22 PROCEDURE — 3700000000 HC ANESTHESIA ATTENDED CARE: Performed by: INTERNAL MEDICINE

## 2024-01-22 PROCEDURE — 2500000003 HC RX 250 WO HCPCS: Performed by: NURSE ANESTHETIST, CERTIFIED REGISTERED

## 2024-01-22 PROCEDURE — 6360000002 HC RX W HCPCS: Performed by: NURSE ANESTHETIST, CERTIFIED REGISTERED

## 2024-01-22 PROCEDURE — 7100000010 HC PHASE II RECOVERY - FIRST 15 MIN: Performed by: INTERNAL MEDICINE

## 2024-01-22 PROCEDURE — 2709999900 HC NON-CHARGEABLE SUPPLY: Performed by: INTERNAL MEDICINE

## 2024-01-22 PROCEDURE — 2580000003 HC RX 258: Performed by: ANESTHESIOLOGY

## 2024-01-22 RX ORDER — SODIUM CHLORIDE 9 MG/ML
INJECTION, SOLUTION INTRAVENOUS CONTINUOUS PRN
Status: DISCONTINUED | OUTPATIENT
Start: 2024-01-22 | End: 2024-01-22 | Stop reason: SDUPTHER

## 2024-01-22 RX ORDER — PROPOFOL 10 MG/ML
INJECTION, EMULSION INTRAVENOUS PRN
Status: DISCONTINUED | OUTPATIENT
Start: 2024-01-22 | End: 2024-01-22 | Stop reason: SDUPTHER

## 2024-01-22 RX ORDER — SODIUM CHLORIDE 9 MG/ML
INJECTION, SOLUTION INTRAVENOUS CONTINUOUS
Status: DISCONTINUED | OUTPATIENT
Start: 2024-01-22 | End: 2024-01-22 | Stop reason: HOSPADM

## 2024-01-22 RX ORDER — LIDOCAINE HYDROCHLORIDE 20 MG/ML
INJECTION, SOLUTION INFILTRATION; PERINEURAL PRN
Status: DISCONTINUED | OUTPATIENT
Start: 2024-01-22 | End: 2024-01-22 | Stop reason: SDUPTHER

## 2024-01-22 RX ADMIN — PROPOFOL 20 MG: 10 INJECTION, EMULSION INTRAVENOUS at 13:48

## 2024-01-22 RX ADMIN — SODIUM CHLORIDE: 9 INJECTION, SOLUTION INTRAVENOUS at 13:05

## 2024-01-22 RX ADMIN — LIDOCAINE HYDROCHLORIDE 60 MG: 20 INJECTION, SOLUTION INFILTRATION; PERINEURAL at 13:34

## 2024-01-22 RX ADMIN — PROPOFOL 20 MG: 10 INJECTION, EMULSION INTRAVENOUS at 13:44

## 2024-01-22 RX ADMIN — PROPOFOL 20 MG: 10 INJECTION, EMULSION INTRAVENOUS at 13:40

## 2024-01-22 RX ADMIN — PROPOFOL 40 MG: 10 INJECTION, EMULSION INTRAVENOUS at 13:34

## 2024-01-22 RX ADMIN — SODIUM CHLORIDE: 9 INJECTION, SOLUTION INTRAVENOUS at 13:27

## 2024-01-22 RX ADMIN — PROPOFOL 20 MG: 10 INJECTION, EMULSION INTRAVENOUS at 13:36

## 2024-01-22 RX ADMIN — PROPOFOL 20 MG: 10 INJECTION, EMULSION INTRAVENOUS at 13:52

## 2024-01-22 RX ADMIN — PHENYLEPHRINE HYDROCHLORIDE 100 MCG: 10 INJECTION INTRAVENOUS at 13:54

## 2024-01-22 ASSESSMENT — PAIN - FUNCTIONAL ASSESSMENT
PAIN_FUNCTIONAL_ASSESSMENT: 0-10

## 2024-01-22 NOTE — H&P
Gastroenterology Note                 Pre-operative History and Physical    Patient: Fatimah Hernández  : 1934  CSN:     History Obtained From:   Patient or guardian.      HISTORY OF PRESENT ILLNESS:    The patient is a 89 y.o. female here for Endoscopy.      Past Medical History:    Past Medical History:   Diagnosis Date    Anxiety 2023    Hyperlipidemia     Hypertension      Past Surgical History:    Past Surgical History:   Procedure Laterality Date    BONY PELVIS SURGERY      repair fx pelvis    HYSTERECTOMY (CERVIX STATUS UNKNOWN)          PARTIAL HIP ARTHROPLASTY Left      Medications Prior to Admission:   No current facility-administered medications on file prior to encounter.     Current Outpatient Medications on File Prior to Encounter   Medication Sig Dispense Refill    ELIQUIS 5 MG TABS tablet TAKE ONE TABLET BY MOUTH TWICE DAILY 180 tablet 1    diclofenac (VOLTAREN) 75 MG EC tablet TAKE ONE TABLET BY MOUTH EVERY DAY 60 tablet 0    vitamin D (ERGOCALCIFEROL) 1.25 MG (92314 UT) CAPS capsule TAKE ONE CAPSULE BY MOUTH Every week on  5 capsule 0    citalopram (CELEXA) 20 MG tablet Take 1 tablet by mouth daily 90 tablet 1    levothyroxine (SYNTHROID) 50 MCG tablet Take 1 tablet by mouth Daily 90 tablet 1    atorvastatin (LIPITOR) 80 MG tablet Take 1 tablet by mouth daily 90 tablet 1    Multiple Vitamins-Minerals (ICAPS AREDS 2 PO) Take by mouth      losartan (COZAAR) 100 MG tablet Take 1 tablet by mouth daily 90 tablet 3    Multiple Vitamin (MULTIVITAMIN ADULT PO) Take 1 tablet by mouth daily      senna (SENOKOT) 8.6 MG tablet Take 1 tablet by mouth as needed for Constipation      acetaminophen (TYLENOL) 325 MG tablet Take 2 tablets by mouth every 6 hours as needed for Pain      furosemide (LASIX) 20 MG tablet TAKE ONE TABLET BY MOUTH EVERY  tablet 0    Cyanocobalamin (B-12) 2500 MCG SUBL Place 1 tablet under the tongue daily (before dinner) (Patient taking

## 2024-01-22 NOTE — ANESTHESIA PRE PROCEDURE
Department of Anesthesiology  Preprocedure Note       Name:  Fatimah Hernández   Age:  89 y.o.  :  1934                                          MRN:  [id number]         Date:  2024      Surgeon: Surgeon(s):  Durga Mendes MD    Procedure: Procedure(s):  COLONOSCOPY DIAGNOSTIC    Medications prior to admission:   Prior to Admission medications    Medication Sig Start Date End Date Taking? Authorizing Provider   ELIQUIS 5 MG TABS tablet TAKE ONE TABLET BY MOUTH TWICE DAILY 23   Raquel Razo MD   diclofenac (VOLTAREN) 75 MG EC tablet TAKE ONE TABLET BY MOUTH EVERY DAY 23   Alexander Day MD   vitamin D (ERGOCALCIFEROL) 1.25 MG (62881 UT) CAPS capsule TAKE ONE CAPSULE BY MOUTH Every week on 23   Alexander Day MD   citalopram (CELEXA) 20 MG tablet Take 1 tablet by mouth daily 23   Alexander Day MD   levothyroxine (SYNTHROID) 50 MCG tablet Take 1 tablet by mouth Daily 23   Alexander Day MD   atorvastatin (LIPITOR) 80 MG tablet Take 1 tablet by mouth daily 23   Alexander Day MD   Multiple Vitamins-Minerals (ICAPS AREDS 2 PO) Take by mouth    ProviderShelli MD   losartan (COZAAR) 100 MG tablet Take 1 tablet by mouth daily 23   Raquel Razo MD   Multiple Vitamin (MULTIVITAMIN ADULT PO) Take 1 tablet by mouth daily    ProviderShelli MD   senna (SENOKOT) 8.6 MG tablet Take 1 tablet by mouth as needed for Constipation    ProviderShelli MD   acetaminophen (TYLENOL) 325 MG tablet Take 2 tablets by mouth every 6 hours as needed for Pain    ProviderShelli MD   furosemide (LASIX) 20 MG tablet TAKE ONE TABLET BY MOUTH EVERY DAY 23   Alexander Day MD   Cyanocobalamin (B-12) 2500 MCG SUBL Place 1 tablet under the tongue daily (before dinner)  Patient taking differently: 1 tablet once a week 23   Alexander Day MD       Current medications:    No current facility-administered medications for this encounter.

## 2024-01-22 NOTE — PROGRESS NOTES
Dr Mendes notified that patient states that she took an eliquis dose yesterday morning at 0800.  No new orders received.

## 2024-01-22 NOTE — ANESTHESIA POSTPROCEDURE EVALUATION
Department of Anesthesiology  Postprocedure Note    Patient: Fatimah Hernández  MRN: [id number]  YOB: 1934  Date of evaluation: 1/22/2024    Procedure Summary       Date: 01/22/24 Room / Location: [facility] / [facility]    Anesthesia Start: 1332 Anesthesia Stop: 1359    Procedure: COLONOSCOPY WITH BIOPSY Diagnosis:       Diarrhea, unspecified type      (Diarrhea, unspecified type [R19.7])    Surgeons: Durga Mendes MD Responsible Provider: Kobe Correa MD    Anesthesia Type: MAC ASA Status: 2            Anesthesia Type: No value filed.    Emilee Phase I: Emilee Score: 10    Emilee Phase II:      Anesthesia Post Evaluation    Patient location during evaluation: PACU  Patient participation: complete - patient participated  Level of consciousness: awake and awake and alert  Pain score: 2  Airway patency: patent  Nausea & Vomiting: no vomiting  Cardiovascular status: blood pressure returned to baseline  Respiratory status: acceptable  Hydration status: euvolemic  Multimodal analgesia pain management approach  Pain management: adequate    No notable events documented.

## 2024-01-22 NOTE — DISCHARGE INSTRUCTIONS

## 2024-01-25 ENCOUNTER — TELEPHONE (OUTPATIENT)
Dept: INTERNAL MEDICINE CLINIC | Age: 89
End: 2024-01-25

## 2024-01-25 RX ORDER — FUROSEMIDE 20 MG/1
TABLET ORAL
Qty: 180 TABLET | Refills: 0 | Status: SHIPPED | OUTPATIENT
Start: 2024-01-25

## 2024-01-25 RX ORDER — ERGOCALCIFEROL 1.25 MG/1
CAPSULE ORAL
Qty: 5 CAPSULE | Refills: 0 | Status: SHIPPED | OUTPATIENT
Start: 2024-01-25

## 2024-01-25 NOTE — TELEPHONE ENCOUNTER
[facility]  calling requesting refill of Vitamin D2 (12/26/23) and Furosemide (7/6/23)     Last written see above  Last OV 11/13/23  Next OV 2/13/24  Last recommended OV NA     Please send to [facility]

## 2024-02-13 ENCOUNTER — OFFICE VISIT (OUTPATIENT)
Dept: INTERNAL MEDICINE CLINIC | Age: 89
End: 2024-02-13

## 2024-02-13 VITALS
BODY MASS INDEX: 27.23 KG/M2 | HEIGHT: 66 IN | WEIGHT: 169.4 LBS | OXYGEN SATURATION: 97 % | SYSTOLIC BLOOD PRESSURE: 124 MMHG | DIASTOLIC BLOOD PRESSURE: 70 MMHG | HEART RATE: 69 BPM

## 2024-02-13 DIAGNOSIS — I10 HYPERTENSION, UNSPECIFIED TYPE: ICD-10-CM

## 2024-02-13 DIAGNOSIS — E06.3 HYPOTHYROIDISM DUE TO HASHIMOTO'S THYROIDITIS: ICD-10-CM

## 2024-02-13 DIAGNOSIS — E03.8 HYPOTHYROIDISM DUE TO HASHIMOTO'S THYROIDITIS: ICD-10-CM

## 2024-02-13 DIAGNOSIS — D68.69 SECONDARY HYPERCOAGULABLE STATE (HCC): ICD-10-CM

## 2024-02-13 DIAGNOSIS — I10 PRIMARY HYPERTENSION: ICD-10-CM

## 2024-02-13 DIAGNOSIS — I48.0 PAROXYSMAL ATRIAL FIBRILLATION (HCC): ICD-10-CM

## 2024-02-13 DIAGNOSIS — M35.3 POLYMYALGIA RHEUMATICA SYNDROME (HCC): ICD-10-CM

## 2024-02-13 DIAGNOSIS — E53.8 B12 DEFICIENCY: ICD-10-CM

## 2024-02-13 DIAGNOSIS — N18.31 STAGE 3A CHRONIC KIDNEY DISEASE (HCC): Primary | ICD-10-CM

## 2024-02-13 DIAGNOSIS — N18.31 STAGE 3A CHRONIC KIDNEY DISEASE (HCC): ICD-10-CM

## 2024-02-13 LAB
ANION GAP SERPL CALCULATED.3IONS-SCNC: 12 MMOL/L (ref 3–16)
BASOPHILS # BLD: 0 K/UL (ref 0–0.2)
BASOPHILS NFR BLD: 0.6 %
BUN SERPL-MCNC: 21 MG/DL (ref 7–20)
CALCIUM SERPL-MCNC: 9.2 MG/DL (ref 8.3–10.6)
CHLORIDE SERPL-SCNC: 96 MMOL/L (ref 99–110)
CO2 SERPL-SCNC: 25 MMOL/L (ref 21–32)
CREAT SERPL-MCNC: 0.9 MG/DL (ref 0.6–1.2)
DEPRECATED RDW RBC AUTO: 14.4 % (ref 12.4–15.4)
EOSINOPHIL # BLD: 0.1 K/UL (ref 0–0.6)
EOSINOPHIL NFR BLD: 1.1 %
GFR SERPLBLD CREATININE-BSD FMLA CKD-EPI: >60 ML/MIN/{1.73_M2}
GLUCOSE SERPL-MCNC: 86 MG/DL (ref 70–99)
HCT VFR BLD AUTO: 32.6 % (ref 36–48)
HGB BLD-MCNC: 11.1 G/DL (ref 12–16)
LYMPHOCYTES # BLD: 1.4 K/UL (ref 1–5.1)
LYMPHOCYTES NFR BLD: 19.1 %
MCH RBC QN AUTO: 35.6 PG (ref 26–34)
MCHC RBC AUTO-ENTMCNC: 34.2 G/DL (ref 31–36)
MCV RBC AUTO: 104.2 FL (ref 80–100)
MONOCYTES # BLD: 0.5 K/UL (ref 0–1.3)
MONOCYTES NFR BLD: 7.5 %
NEUTROPHILS # BLD: 5.2 K/UL (ref 1.7–7.7)
NEUTROPHILS NFR BLD: 71.7 %
PLATELET # BLD AUTO: 286 K/UL (ref 135–450)
PMV BLD AUTO: 8.5 FL (ref 5–10.5)
POTASSIUM SERPL-SCNC: 5.2 MMOL/L (ref 3.5–5.1)
RBC # BLD AUTO: 3.13 M/UL (ref 4–5.2)
SODIUM SERPL-SCNC: 133 MMOL/L (ref 136–145)
TSH SERPL DL<=0.005 MIU/L-ACNC: 3.14 UIU/ML (ref 0.27–4.2)
WBC # BLD AUTO: 7.2 K/UL (ref 4–11)

## 2024-02-13 NOTE — PROGRESS NOTES
11/13/2023 01:43 PM    CALCIUM 9.4 08/21/2023 11:04 AM     Lab Results   Component Value Date/Time    WBC 6.7 11/13/2023 01:43 PM    WBC 6.3 01/24/2023 10:53 AM    HGB 11.5 11/13/2023 01:43 PM    HGB 10.8 01/24/2023 10:53 AM    HCT 32.5 11/13/2023 01:43 PM    HCT 32.6 01/24/2023 10:53 AM    MCV 99.1 11/13/2023 01:43 PM    .8 01/24/2023 10:53 AM     11/13/2023 01:43 PM     01/24/2023 10:53 AM     Lab Results   Component Value Date/Time    CHOL 138 11/13/2023 01:43 PM    CHOL 176 01/24/2023 10:53 AM    TRIG 159 11/13/2023 01:43 PM    TRIG 75 01/24/2023 10:53 AM    HDL 60 11/13/2023 01:43 PM    HDL 80 01/24/2023 10:53 AM           2/13/2024    12:52 PM 1/22/2024     2:15 PM 1/22/2024     2:12 PM   Vitals   SYSTOLIC 124 146 146   DIASTOLIC 70 64 71   Pulse 69 57 59   Respirations  16 16   SpO2 97 % 100 % 98 %   Weight - Scale 169 lb 6.4 oz     Height 5' 6\"     Body Mass Index 27.34 kg/m2     Pain Level  0 0       Hypertension  This is a chronic problem. The current episode started more than 1 year ago. The problem is unchanged. Pertinent negatives include no chest pain, headaches, palpitations or shortness of breath. Identifiable causes of hypertension include a thyroid problem.   Thyroid Problem  Presents for follow-up visit. Patient reports no cold intolerance, constipation, fatigue, heat intolerance, leg swelling, menstrual problem, palpitations, tremors or visual change.       Review of Systems   Constitutional:  Negative for activity change, appetite change, fatigue and unexpected weight change.   HENT:  Negative for congestion, ear pain and sinus pain.    Respiratory:  Negative for cough, chest tightness, shortness of breath and wheezing.    Cardiovascular:  Negative for chest pain and palpitations.   Gastrointestinal:  Negative for abdominal pain, blood in stool and constipation.   Endocrine: Negative for cold intolerance, heat intolerance and polyuria.   Genitourinary:  Negative for dysuria,

## 2024-02-28 RX ORDER — DICLOFENAC SODIUM 75 MG/1
TABLET, DELAYED RELEASE ORAL
Qty: 90 TABLET | Refills: 1 | Status: SHIPPED | OUTPATIENT
Start: 2024-02-28

## 2024-02-28 RX ORDER — ERGOCALCIFEROL 1.25 MG/1
CAPSULE ORAL
Qty: 12 CAPSULE | Refills: 1 | Status: SHIPPED | OUTPATIENT
Start: 2024-02-28

## 2024-02-28 NOTE — TELEPHONE ENCOUNTER
Last OV: 2/13/2024  Next OV: 8/13/2024    Last fill:Voltaren 12/26/23 no refills and Vit D 1/25/24 with no refills.

## 2024-02-29 RX ORDER — ATORVASTATIN CALCIUM 80 MG/1
80 TABLET, FILM COATED ORAL DAILY
Qty: 90 TABLET | Refills: 0 | Status: SHIPPED | OUTPATIENT
Start: 2024-02-29 | End: 2024-05-29

## 2024-05-23 ENCOUNTER — TELEPHONE (OUTPATIENT)
Dept: INTERNAL MEDICINE CLINIC | Age: 89
End: 2024-05-23

## 2024-05-23 DIAGNOSIS — E03.8 HYPOTHYROIDISM DUE TO HASHIMOTO'S THYROIDITIS: ICD-10-CM

## 2024-05-23 DIAGNOSIS — F41.9 ANXIETY: ICD-10-CM

## 2024-05-23 DIAGNOSIS — E06.3 HYPOTHYROIDISM DUE TO HASHIMOTO'S THYROIDITIS: ICD-10-CM

## 2024-05-23 RX ORDER — LEVOTHYROXINE SODIUM 0.05 MG/1
50 TABLET ORAL DAILY
Qty: 90 TABLET | Refills: 1 | Status: SHIPPED | OUTPATIENT
Start: 2024-05-23

## 2024-05-23 RX ORDER — CITALOPRAM 20 MG/1
20 TABLET ORAL DAILY
Qty: 90 TABLET | Refills: 0 | Status: SHIPPED | OUTPATIENT
Start: 2024-05-23

## 2024-05-23 NOTE — TELEPHONE ENCOUNTER
Medication: Citalopram    Last Filled:  11/13/2023    Patient Pharmacy: [id number]    Patient Phone Number: [phone] (home)     Last appt: 2/13/2024   Next appt: 8/13/2024    Last OARRS:        No data to display                Last Labs DM: No results found for: \"LABA1C\"  Last Lipid:   Lab Results   Component Value Date/Time    CHOL 138 11/13/2023 01:43 PM    TRIG 159 11/13/2023 01:43 PM    HDL 60 11/13/2023 01:43 PM     Last PSA: No results found for: \"PSA\"  Last Thyroid:   Lab Results   Component Value Date/Time    TSH 3.14 02/13/2024 01:39 PM    T4FREE 1.4 01/24/2023 10:53 AM

## 2024-06-03 NOTE — PROGRESS NOTES
[facility]   Cardiac Evaluation      Patient: Fatimah Hernández  YOB: 1934         Chief Complaint   Patient presents with    Hypertension    Hyperlipidemia        Referring provider: Alexander Day MD    History of Present Illness:    Ms Hernández is seen for follow up.  She was initially seen as a self referral after hospitalization 10/3-10/4/22. Fatimah was hospitalized with expressive aphasia and hand numbness for 2 hours prior to hospital arrival. Her symptoms resolved. Carotid doppler revealed <50% stenosis bilaterally. She had an echo with EF 55%, moderate AR, mild MR. She was started on ASA and Lipitor 80mg daily. She had previously been taken off Simvastatin by her PMD because of her age that she took for 40+ years. She has a strong family history of early heart disease in her mother and aunts.       Ms Hernández was hospitalized 5/2/23 with aphasia. She had undergone fractured pelvic surgery on 4/7/23 and was in a nursing facility for rehab. MRI of the brain showed small-moderate sized acute infarct along the left central sulcus and subcortical region. She was taking Plavix and Lipitor 80mg daily. During this hospitalization she was started on sodium chloride tabs. Prior to this event Ms Hernández's 61 yo son suddenly passed away from angioedema.     She was hospitalized 7/21/23 with TIA. Hospital notes say she had atrial fibrillation on telemetry. Plavix and asa were changed to Eliquis 5mg BID. She was discharged with 30 day cardiac monitor. Echo was done that revealed EF 55-60%. All the EKGs from the hospital show SR.     Today, Fatimah states she aches all over. She states she cannot lift her arms in the morning and her hands feel numb. She is not following with a rheumatologist currently. She was previously treated for PMR with prednisone by Dr James.  Fatimah denies chest pain, palpitations, CHRISTIE, dizziness, or edema. She does not do regular exercise.     Past Medical

## 2024-06-20 ENCOUNTER — TELEPHONE (OUTPATIENT)
Dept: CARDIOLOGY CLINIC | Age: 89
End: 2024-06-20

## 2024-06-20 DIAGNOSIS — E78.49 OTHER HYPERLIPIDEMIA: Primary | ICD-10-CM

## 2024-06-20 DIAGNOSIS — M35.3 POLYMYALGIA RHEUMATICA SYNDROME (HCC): ICD-10-CM

## 2024-06-20 NOTE — TELEPHONE ENCOUNTER
Spoke w/ pt and let her know lab orders placed for lipids and liver. She will go to [facility] to have drawn

## 2024-06-21 DIAGNOSIS — M35.3 POLYMYALGIA RHEUMATICA SYNDROME (HCC): ICD-10-CM

## 2024-06-21 DIAGNOSIS — E78.49 OTHER HYPERLIPIDEMIA: ICD-10-CM

## 2024-06-21 LAB
ALBUMIN SERPL-MCNC: 4.1 G/DL (ref 3.4–5)
ALP SERPL-CCNC: 96 U/L (ref 40–129)
ALT SERPL-CCNC: 13 U/L (ref 10–40)
AST SERPL-CCNC: 19 U/L (ref 15–37)
BILIRUB DIRECT SERPL-MCNC: <0.2 MG/DL (ref 0–0.3)
BILIRUB INDIRECT SERPL-MCNC: NORMAL MG/DL (ref 0–1)
BILIRUB SERPL-MCNC: 0.6 MG/DL (ref 0–1)
CHOLEST SERPL-MCNC: 155 MG/DL (ref 0–199)
CRP SERPL-MCNC: 22.7 MG/L (ref 0–5.1)
ERYTHROCYTE [SEDIMENTATION RATE] IN BLOOD BY WESTERGREN METHOD: 25 MM/HR (ref 0–30)
HDLC SERPL-MCNC: 67 MG/DL (ref 40–60)
LDLC SERPL CALC-MCNC: 60 MG/DL
PROT SERPL-MCNC: 6.7 G/DL (ref 6.4–8.2)
TRIGL SERPL-MCNC: 140 MG/DL (ref 0–150)
VLDLC SERPL CALC-MCNC: 28 MG/DL

## 2024-06-24 RX ORDER — ATORVASTATIN CALCIUM 80 MG/1
80 TABLET, FILM COATED ORAL DAILY
Qty: 30 TABLET | Refills: 0 | Status: SHIPPED | OUTPATIENT
Start: 2024-06-24 | End: 2024-09-22

## 2024-06-25 RX ORDER — APIXABAN 5 MG/1
5 TABLET, FILM COATED ORAL 2 TIMES DAILY
Qty: 180 TABLET | Refills: 3 | Status: SHIPPED | OUTPATIENT
Start: 2024-06-25

## 2024-06-26 ENCOUNTER — TELEPHONE (OUTPATIENT)
Dept: INTERNAL MEDICINE CLINIC | Age: 89
End: 2024-06-26

## 2024-06-26 ENCOUNTER — OFFICE VISIT (OUTPATIENT)
Dept: CARDIOLOGY CLINIC | Age: 89
End: 2024-06-26
Payer: MEDICARE

## 2024-06-26 VITALS
HEIGHT: 66 IN | HEART RATE: 69 BPM | BODY MASS INDEX: 27.97 KG/M2 | OXYGEN SATURATION: 95 % | SYSTOLIC BLOOD PRESSURE: 110 MMHG | WEIGHT: 174 LBS | DIASTOLIC BLOOD PRESSURE: 64 MMHG

## 2024-06-26 DIAGNOSIS — G45.9 TIA (TRANSIENT ISCHEMIC ATTACK): ICD-10-CM

## 2024-06-26 DIAGNOSIS — E78.49 OTHER HYPERLIPIDEMIA: Primary | ICD-10-CM

## 2024-06-26 DIAGNOSIS — I48.0 PAROXYSMAL ATRIAL FIBRILLATION (HCC): ICD-10-CM

## 2024-06-26 DIAGNOSIS — I10 HYPERTENSION, UNSPECIFIED TYPE: ICD-10-CM

## 2024-06-26 PROCEDURE — 99214 OFFICE O/P EST MOD 30 MIN: CPT | Performed by: INTERNAL MEDICINE

## 2024-06-26 PROCEDURE — 1123F ACP DISCUSS/DSCN MKR DOCD: CPT | Performed by: INTERNAL MEDICINE

## 2024-06-26 NOTE — TELEPHONE ENCOUNTER
Patient stated she needs a letter faxed to [facility] stating she is disabled so that she can qualify for her stair lift that is suppose to be getting installed today the fax number is [phone]

## 2024-06-26 NOTE — TELEPHONE ENCOUNTER
Attempted to call patient back to discuss her concerns as the message from the call center was not very clear. This writer started to leave a VM but the call appeared to be picked up but there was no answer. Patient was called back but call just rang and no answer.

## 2024-06-26 NOTE — TELEPHONE ENCOUNTER
----- Message from Lexis Renard Albert sent at 6/25/2024  8:39 AM EDT -----  Regarding: ECC Message to Provider  ECC Message to Provider    Relationship to Patient: Self     Additional Information Patient wanted to be contacted as soon as possible to inquires about her concerns in regards with the Steris that she need because she have a disability and she cannot go up and down and also she have many concerns so she wanted to speak with someone on the office or she need to speak with one of the nurse in the practice.  --------------------------------------------------------------------------------------------------------------------------    Call Back Information: OK to leave message on voicemail  Preferred Call Back Number: Phone [phone] (home)

## 2024-06-27 ENCOUNTER — OFFICE VISIT (OUTPATIENT)
Dept: INTERNAL MEDICINE CLINIC | Age: 89
End: 2024-06-27
Payer: MEDICARE

## 2024-06-27 VITALS
WEIGHT: 175 LBS | OXYGEN SATURATION: 97 % | DIASTOLIC BLOOD PRESSURE: 72 MMHG | HEIGHT: 66 IN | HEART RATE: 72 BPM | BODY MASS INDEX: 28.12 KG/M2 | SYSTOLIC BLOOD PRESSURE: 136 MMHG

## 2024-06-27 DIAGNOSIS — E03.8 HYPOTHYROIDISM DUE TO HASHIMOTO'S THYROIDITIS: ICD-10-CM

## 2024-06-27 DIAGNOSIS — E06.3 HYPOTHYROIDISM DUE TO HASHIMOTO'S THYROIDITIS: ICD-10-CM

## 2024-06-27 DIAGNOSIS — I15.0 RENOVASCULAR HYPERTENSION: ICD-10-CM

## 2024-06-27 DIAGNOSIS — R05.3 CHRONIC COUGH: ICD-10-CM

## 2024-06-27 DIAGNOSIS — M35.3 POLYMYALGIA RHEUMATICA SYNDROME (HCC): ICD-10-CM

## 2024-06-27 DIAGNOSIS — G62.9 NEUROPATHY: ICD-10-CM

## 2024-06-27 DIAGNOSIS — E53.8 B12 DEFICIENCY: ICD-10-CM

## 2024-06-27 DIAGNOSIS — M35.3 POLYMYALGIA RHEUMATICA SYNDROME (HCC): Primary | ICD-10-CM

## 2024-06-27 PROBLEM — I10 HYPERTENSION: Status: RESOLVED | Noted: 2022-12-21 | Resolved: 2024-06-27

## 2024-06-27 LAB
ALBUMIN SERPL-MCNC: 4.7 G/DL (ref 3.4–5)
ALBUMIN/GLOB SERPL: 1.7 {RATIO} (ref 1.1–2.2)
ALP SERPL-CCNC: 95 U/L (ref 40–129)
ALT SERPL-CCNC: 22 U/L (ref 10–40)
ANION GAP SERPL CALCULATED.3IONS-SCNC: 15 MMOL/L (ref 3–16)
AST SERPL-CCNC: 27 U/L (ref 15–37)
BASOPHILS # BLD: 0 K/UL (ref 0–0.2)
BASOPHILS NFR BLD: 0.1 %
BILIRUB SERPL-MCNC: 0.4 MG/DL (ref 0–1)
BUN SERPL-MCNC: 26 MG/DL (ref 7–20)
CALCIUM SERPL-MCNC: 9.6 MG/DL (ref 8.3–10.6)
CHLORIDE SERPL-SCNC: 91 MMOL/L (ref 99–110)
CO2 SERPL-SCNC: 24 MMOL/L (ref 21–32)
CREAT SERPL-MCNC: 1.1 MG/DL (ref 0.6–1.2)
DEPRECATED RDW RBC AUTO: 12.5 % (ref 12.4–15.4)
EOSINOPHIL # BLD: 0 K/UL (ref 0–0.6)
EOSINOPHIL NFR BLD: 0 %
FOLATE SERPL-MCNC: 13.63 NG/ML (ref 4.78–24.2)
GFR SERPLBLD CREATININE-BSD FMLA CKD-EPI: 48 ML/MIN/{1.73_M2}
GLUCOSE SERPL-MCNC: 124 MG/DL (ref 70–99)
HCT VFR BLD AUTO: 30.8 % (ref 36–48)
HGB BLD-MCNC: 10.5 G/DL (ref 12–16)
LYMPHOCYTES # BLD: 0.8 K/UL (ref 1–5.1)
LYMPHOCYTES NFR BLD: 8.4 %
MCH RBC QN AUTO: 35 PG (ref 26–34)
MCHC RBC AUTO-ENTMCNC: 34.1 G/DL (ref 31–36)
MCV RBC AUTO: 102.6 FL (ref 80–100)
MONOCYTES # BLD: 0.3 K/UL (ref 0–1.3)
MONOCYTES NFR BLD: 2.6 %
NEUTROPHILS # BLD: 8.9 K/UL (ref 1.7–7.7)
NEUTROPHILS NFR BLD: 88.9 %
PLATELET # BLD AUTO: 309 K/UL (ref 135–450)
PMV BLD AUTO: 8.2 FL (ref 5–10.5)
POTASSIUM SERPL-SCNC: 5.9 MMOL/L (ref 3.5–5.1)
PROT SERPL-MCNC: 7.5 G/DL (ref 6.4–8.2)
RBC # BLD AUTO: 3 M/UL (ref 4–5.2)
SODIUM SERPL-SCNC: 130 MMOL/L (ref 136–145)
VIT B12 SERPL-MCNC: 748 PG/ML (ref 211–911)
WBC # BLD AUTO: 10 K/UL (ref 4–11)

## 2024-06-27 PROCEDURE — 1123F ACP DISCUSS/DSCN MKR DOCD: CPT | Performed by: INTERNAL MEDICINE

## 2024-06-27 PROCEDURE — 99215 OFFICE O/P EST HI 40 MIN: CPT | Performed by: INTERNAL MEDICINE

## 2024-06-27 PROCEDURE — G2211 COMPLEX E/M VISIT ADD ON: HCPCS | Performed by: INTERNAL MEDICINE

## 2024-06-27 RX ORDER — PREDNISONE 20 MG/1
20 TABLET ORAL DAILY
Qty: 30 TABLET | Refills: 0 | Status: SHIPPED | OUTPATIENT
Start: 2024-06-27 | End: 2024-07-27

## 2024-06-27 NOTE — PROGRESS NOTES
Fatimah Hernández (:  1934) is a 89 y.o. female,Established patient, here for evaluation of the following chief complaint(s):  Discuss Labs (CRP )      Assessment & Plan   ASSESSMENT/PLAN:  1. Polymyalgia rheumatica syndrome (HCC)  -     TSH; Future  -     predniSONE (DELTASONE) 20 MG tablet; Take 1 tablet by mouth daily, Disp-30 tablet, R-0Normal  2. B12 deficiency  -     Vitamin B12 & Folate; Future  3. Renovascular hypertension  4. Neuropathy  -     TSH; Future  -     Vitamin B12 & Folate; Future  -     Electrophoresis Protein, Serum; Future  -     CBC with Auto Differential; Future  -     Comprehensive Metabolic Panel; Future  -     Treponema Pallidum (Syphilis) Antibody; Future  5. Hypothyroidism due to Hashimoto's thyroiditis  -     TSH; Future  6. Chronic cough  -     XR CHEST STANDARD (2 VW); Future  Patient symptomatology is very characteristic and consistent with polymyalgia rheumatica she did try a dose of prednisone 20 mg with significant improvement so we will have her take the prednisone 20 mg for 2 weeks and then cut her down to 10 mg and try to follow a less aggressive course given her age and to try and prevent any adrenal axis suppression if the patient symptoms flareup we may have to extend it to 6 weeks instead    On the other hand we have to rule out any other neuropathy etiology so some blood testing will be done today as part of her evaluation    Patient B12 deficiency was covered with her B12 tablets nonetheless if her level is continuing to drop we may have to switch her over to the sublingual B12 to prevent any progression    Okay to have the patient thyroid function checked to make sure that staying steady and stable and to be eliminated as one of the etiologies for the neuropathy    Return in about 6 weeks (around 2024).         Subjective   SUBJECTIVE/OBJECTIVE:    Lab Review   Lab Results   Component Value Date/Time     2024 01:39 PM     2023 02:27

## 2024-06-28 DIAGNOSIS — E87.5 HYPERKALEMIA: Primary | ICD-10-CM

## 2024-06-28 LAB
ALBUMIN SERPL ELPH-MCNC: 4 G/DL (ref 3.1–4.9)
ALPHA1 GLOB SERPL ELPH-MCNC: 0.3 G/DL (ref 0.2–0.4)
ALPHA2 GLOB SERPL ELPH-MCNC: 1 G/DL (ref 0.4–1.1)
B-GLOBULIN SERPL ELPH-MCNC: 1.1 G/DL (ref 0.9–1.6)
GAMMA GLOB SERPL ELPH-MCNC: 1.1 G/DL (ref 0.6–1.8)
SPE/IFE INTERPRETATION: NORMAL
TSH SERPL DL<=0.005 MIU/L-ACNC: 1 UIU/ML (ref 0.27–4.2)

## 2024-06-28 NOTE — RESULT ENCOUNTER NOTE
Please let the patient know that results were acceptable except for her chemistry which showed her sodium sodium to be low and potassium to be high and she looks to be dehydrated so advised patient to hydrate better also patient indicated to me that she takes potassium supplementation so she needs to stop that right away and repeat her blood test within 2 days

## 2024-06-30 LAB — T PALLIDUM IGG SER QL IF: NON REACTIVE

## 2024-07-22 DIAGNOSIS — I10 HYPERTENSION, UNSPECIFIED TYPE: ICD-10-CM

## 2024-07-22 NOTE — TELEPHONE ENCOUNTER
Requested Prescriptions     Pending Prescriptions Disp Refills    losartan (COZAAR) 100 MG tablet [Pharmacy Med Name: losartan 100 mg tablet] 90 tablet 3     Sig: TAKE ONE TABLET BY MOUTH EVERY DAY      [facility]     Last OV:  6/26/2024 DAH    Next OV: 12/31/2024 DAH    Last Labs: 06/27/2024 CMP    Last Filled: 08/29/2023 DAH

## 2024-07-23 DIAGNOSIS — E55.9 VITAMIN D DEFICIENCY: Primary | ICD-10-CM

## 2024-07-23 RX ORDER — ERGOCALCIFEROL 1.25 MG/1
CAPSULE ORAL
Qty: 12 CAPSULE | Refills: 0 | Status: SHIPPED | OUTPATIENT
Start: 2024-07-23

## 2024-07-23 RX ORDER — FUROSEMIDE 20 MG/1
TABLET ORAL
Qty: 90 TABLET | Refills: 0 | Status: SHIPPED | OUTPATIENT
Start: 2024-07-23

## 2024-07-23 RX ORDER — LOSARTAN POTASSIUM 100 MG/1
100 TABLET ORAL DAILY
Qty: 90 TABLET | Refills: 3 | Status: SHIPPED | OUTPATIENT
Start: 2024-07-23

## 2024-07-23 RX ORDER — ATORVASTATIN CALCIUM 80 MG/1
80 TABLET, FILM COATED ORAL DAILY
Qty: 90 TABLET | Refills: 0 | Status: SHIPPED | OUTPATIENT
Start: 2024-07-23 | End: 2024-10-21

## 2024-07-23 RX ORDER — DICLOFENAC SODIUM 75 MG/1
TABLET, DELAYED RELEASE ORAL
Qty: 90 TABLET | Refills: 0 | Status: SHIPPED | OUTPATIENT
Start: 2024-07-23

## 2024-07-23 RX ORDER — DICLOFENAC SODIUM 75 MG/1
TABLET, DELAYED RELEASE ORAL
Qty: 90 TABLET | Refills: 0 | Status: CANCELLED | OUTPATIENT
Start: 2024-07-23

## 2024-07-24 DIAGNOSIS — M35.3 POLYMYALGIA RHEUMATICA SYNDROME (HCC): ICD-10-CM

## 2024-07-24 RX ORDER — PREDNISONE 20 MG/1
20 TABLET ORAL DAILY
Qty: 30 TABLET | Refills: 0 | OUTPATIENT
Start: 2024-07-24

## 2024-08-07 ENCOUNTER — TELEPHONE (OUTPATIENT)
Dept: INTERNAL MEDICINE CLINIC | Age: 89
End: 2024-08-07

## 2024-08-07 RX ORDER — PREDNISONE 10 MG/1
10 TABLET ORAL DAILY
Qty: 30 TABLET | Refills: 0 | Status: SHIPPED | OUTPATIENT
Start: 2024-08-07 | End: 2024-09-06

## 2024-08-07 NOTE — TELEPHONE ENCOUNTER
Two days left of 10 mg dose of prednisone per patient (stated cutting pill in half for dose of 10) asks if she will get a refill - please send refill.

## 2024-08-07 NOTE — TELEPHONE ENCOUNTER
Please advise the patient that I did send a prescription for the 10 mg prednisone she can start by taking half a tablet alternating with a full tablet every other day for the next month

## 2024-08-13 ENCOUNTER — OFFICE VISIT (OUTPATIENT)
Dept: INTERNAL MEDICINE CLINIC | Age: 89
End: 2024-08-13
Payer: MEDICARE

## 2024-08-13 VITALS
OXYGEN SATURATION: 98 % | SYSTOLIC BLOOD PRESSURE: 118 MMHG | BODY MASS INDEX: 28.67 KG/M2 | WEIGHT: 178.4 LBS | DIASTOLIC BLOOD PRESSURE: 58 MMHG | HEIGHT: 66 IN | HEART RATE: 58 BPM

## 2024-08-13 DIAGNOSIS — E03.8 HYPOTHYROIDISM DUE TO HASHIMOTO'S THYROIDITIS: ICD-10-CM

## 2024-08-13 DIAGNOSIS — N18.31 STAGE 3A CHRONIC KIDNEY DISEASE (HCC): ICD-10-CM

## 2024-08-13 DIAGNOSIS — I48.0 PAROXYSMAL ATRIAL FIBRILLATION (HCC): ICD-10-CM

## 2024-08-13 DIAGNOSIS — Z00.00 MEDICARE ANNUAL WELLNESS VISIT, SUBSEQUENT: Primary | ICD-10-CM

## 2024-08-13 DIAGNOSIS — I15.0 RENOVASCULAR HYPERTENSION: ICD-10-CM

## 2024-08-13 DIAGNOSIS — E06.3 HYPOTHYROIDISM DUE TO HASHIMOTO'S THYROIDITIS: ICD-10-CM

## 2024-08-13 DIAGNOSIS — M35.3 POLYMYALGIA RHEUMATICA SYNDROME (HCC): ICD-10-CM

## 2024-08-13 DIAGNOSIS — E78.49 OTHER HYPERLIPIDEMIA: ICD-10-CM

## 2024-08-13 DIAGNOSIS — E55.9 VITAMIN D DEFICIENCY: ICD-10-CM

## 2024-08-13 PROCEDURE — 1123F ACP DISCUSS/DSCN MKR DOCD: CPT | Performed by: INTERNAL MEDICINE

## 2024-08-13 PROCEDURE — G0439 PPPS, SUBSEQ VISIT: HCPCS | Performed by: INTERNAL MEDICINE

## 2024-08-13 PROCEDURE — 99214 OFFICE O/P EST MOD 30 MIN: CPT | Performed by: INTERNAL MEDICINE

## 2024-08-13 RX ORDER — PREDNISONE 5 MG/1
5 TABLET ORAL 2 TIMES DAILY
Qty: 60 TABLET | Refills: 0 | Status: SHIPPED | OUTPATIENT
Start: 2024-08-13 | End: 2024-09-12

## 2024-08-13 SDOH — ECONOMIC STABILITY: FOOD INSECURITY: WITHIN THE PAST 12 MONTHS, THE FOOD YOU BOUGHT JUST DIDN'T LAST AND YOU DIDN'T HAVE MONEY TO GET MORE.: NEVER TRUE

## 2024-08-13 SDOH — ECONOMIC STABILITY: FOOD INSECURITY: WITHIN THE PAST 12 MONTHS, YOU WORRIED THAT YOUR FOOD WOULD RUN OUT BEFORE YOU GOT MONEY TO BUY MORE.: NEVER TRUE

## 2024-08-13 SDOH — ECONOMIC STABILITY: INCOME INSECURITY: HOW HARD IS IT FOR YOU TO PAY FOR THE VERY BASICS LIKE FOOD, HOUSING, MEDICAL CARE, AND HEATING?: NOT HARD AT ALL

## 2024-08-13 ASSESSMENT — ENCOUNTER SYMPTOMS
COLOR CHANGE: 0
SHORTNESS OF BREATH: 0
SINUS PAIN: 0
BLOOD IN STOOL: 0
WHEEZING: 0
CHEST TIGHTNESS: 0
ABDOMINAL PAIN: 0
COUGH: 0
CONSTIPATION: 0

## 2024-08-13 ASSESSMENT — PATIENT HEALTH QUESTIONNAIRE - PHQ9
2. FEELING DOWN, DEPRESSED OR HOPELESS: NOT AT ALL
1. LITTLE INTEREST OR PLEASURE IN DOING THINGS: NOT AT ALL
SUM OF ALL RESPONSES TO PHQ9 QUESTIONS 1 & 2: 0
SUM OF ALL RESPONSES TO PHQ QUESTIONS 1-9: 0

## 2024-08-13 ASSESSMENT — LIFESTYLE VARIABLES
HOW OFTEN DO YOU HAVE A DRINK CONTAINING ALCOHOL: NEVER
HOW MANY STANDARD DRINKS CONTAINING ALCOHOL DO YOU HAVE ON A TYPICAL DAY: PATIENT DOES NOT DRINK

## 2024-08-13 NOTE — PROGRESS NOTES
organomegaly  Extremities: no cyanosis, clubbing or edema  Musculoskeletal: normal range of motion, no joint swelling, deformity or tenderness  Neurologic: reflexes normal and symmetric, no cranial nerve deficit, gait, coordination and speech normal          No Known Allergies  Prior to Visit Medications    Medication Sig Taking? Authorizing Provider   predniSONE (DELTASONE) 5 MG tablet Take 1 tablet by mouth 2 times daily Yes Alexander Day MD   predniSONE (DELTASONE) 10 MG tablet Take 1 tablet by mouth daily Yes Alexander Day MD   losartan (COZAAR) 100 MG tablet TAKE ONE TABLET BY MOUTH EVERY DAY Yes Raquel Razo MD   atorvastatin (LIPITOR) 80 MG tablet Take 1 tablet by mouth daily Yes Alexander Day MD   furosemide (LASIX) 20 MG tablet TAKE ONE TABLET BY MOUTH EVERY DAY Yes Alexander Day MD   vitamin D (ERGOCALCIFEROL) 1.25 MG (59865 UT) CAPS capsule TAKE ONE CAPSULE BY MOUTH Every week on sunday Yes Alexander Day MD   diclofenac (VOLTAREN) 75 MG EC tablet TAKE ONE TABLET BY MOUTH EVERY DAY Yes Nancy Clarke, APRN - CNP   ELIQUIS 5 MG TABS tablet TAKE ONE TABLET BY MOUTH TWICE DAILY Yes Raquel Razo MD   citalopram (CELEXA) 20 MG tablet Take 1 tablet by mouth daily Yes Alexander Day MD   levothyroxine (SYNTHROID) 50 MCG tablet Take 1 tablet by mouth Daily Yes Alexander Day MD   Multiple Vitamins-Minerals (ICAPS AREDS 2 PO) Take by mouth Yes ProviderShelli MD   Multiple Vitamin (MULTIVITAMIN ADULT PO) Take 1 tablet by mouth daily Yes ProviderShelli MD   senna (SENOKOT) 8.6 MG tablet Take 1 tablet by mouth as needed for Constipation Yes ProviderShelli MD   acetaminophen (TYLENOL) 325 MG tablet Take 2 tablets by mouth every 6 hours as needed for Pain Yes Shelli White MD   Cyanocobalamin (B-12) 2500 MCG SUBL Place 1 tablet under the tongue daily (before dinner)  Patient taking differently: 1 tablet once a week  Alexander Day MD       CareTeam (Including

## 2024-08-13 NOTE — PATIENT INSTRUCTIONS
salt when you think you need it. With time, your taste buds will adjust to less salt.     Eat fewer snack items, fast foods, canned soups, and other high-salt, high-fat, processed foods.     Read food labels and try to avoid saturated and trans fats. They increase your risk of heart disease by raising cholesterol levels.     Limit the amount of solid fat--butter, margarine, and shortening--you eat. Use olive, peanut, or canola oil when you cook. Bake, broil, and steam foods instead of frying them.     Eat a variety of fruit and vegetables every day. Dark green, deep orange, red, or yellow fruits and vegetables are especially good for you. Examples include spinach, carrots, peaches, and berries.     Foods high in fiber can reduce your cholesterol and provide important vitamins and minerals. High-fiber foods include whole-grain cereals and breads, oatmeal, beans, brown rice, citrus fruits, and apples.     Eat lean proteins. Heart-healthy proteins include seafood, lean meats and poultry, eggs, beans, peas, nuts, seeds, and soy products.     Limit drinks and foods with added sugar. These include candy, desserts, and soda pop.   Heart-healthy lifestyle    If your doctor recommends it, get more exercise. For many people, walking is a good choice. Or you may want to swim, bike, or do other activities. Bit by bit, increase the time you're active every day. Try for at least 30 minutes on most days of the week.     Try to quit or cut back on using tobacco and other nicotine products. This includes smoking and vaping. If you need help quitting, talk to your doctor about stop-smoking programs and medicines. These can increase your chances of quitting for good. Quitting is one of the most important things you can do to protect your heart. It is never too late to quit. Try to avoid secondhand smoke too.     Stay at a weight that's healthy for you. Talk to your doctor if you need help losing weight.     Try to get 7 to 9 hours of

## 2024-08-21 DIAGNOSIS — F41.9 ANXIETY: ICD-10-CM

## 2024-08-21 RX ORDER — CITALOPRAM 20 MG/1
20 TABLET ORAL DAILY
Qty: 90 TABLET | Refills: 0 | Status: SHIPPED | OUTPATIENT
Start: 2024-08-21

## 2024-08-21 NOTE — TELEPHONE ENCOUNTER
Medication: Citalopram 20mg    Last Filled:  05/23/24    Patient Pharmacy: [id number]    Patient Phone Number: [phone] (home)     Last appt: 8/13/2024   Next appt: 11/14/2024    Last OARRS:        No data to display                Last Labs DM: No results found for: \"LABA1C\"  Last Lipid:   Lab Results   Component Value Date/Time    CHOL 155 06/21/2024 11:47 AM    TRIG 140 06/21/2024 11:47 AM    HDL 67 06/21/2024 11:47 AM     Last PSA: No results found for: \"PSA\"  Last Thyroid:   Lab Results   Component Value Date/Time    TSH 1.00 06/27/2024 12:19 PM    T4FREE 1.4 01/24/2023 10:53 AM

## 2024-10-22 RX ORDER — ATORVASTATIN CALCIUM 80 MG/1
80 TABLET, FILM COATED ORAL DAILY
Qty: 90 TABLET | Refills: 0 | Status: SHIPPED | OUTPATIENT
Start: 2024-10-22 | End: 2024-10-23 | Stop reason: SDUPTHER

## 2024-10-22 RX ORDER — FUROSEMIDE 20 MG/1
TABLET ORAL
Qty: 90 TABLET | Refills: 0 | Status: SHIPPED | OUTPATIENT
Start: 2024-10-22 | End: 2024-10-23 | Stop reason: SDUPTHER

## 2024-10-22 NOTE — TELEPHONE ENCOUNTER
Last OV: 8/13/2024  Next OV: 11/14/2024    Next appointment due:11/13/2024    Last fill:7/23/2024  Refills:0

## 2024-10-23 ENCOUNTER — TELEPHONE (OUTPATIENT)
Dept: INTERNAL MEDICINE CLINIC | Age: 89
End: 2024-10-23

## 2024-10-23 RX ORDER — ATORVASTATIN CALCIUM 80 MG/1
80 TABLET, FILM COATED ORAL DAILY
Qty: 90 TABLET | Refills: 0 | Status: SHIPPED | OUTPATIENT
Start: 2024-10-23 | End: 2025-01-21

## 2024-10-23 RX ORDER — FUROSEMIDE 20 MG/1
TABLET ORAL
Qty: 90 TABLET | Refills: 0 | Status: SHIPPED | OUTPATIENT
Start: 2024-10-23

## 2024-10-23 NOTE — TELEPHONE ENCOUNTER
Pt Atorvastatin and Furosemide were sent to [facility] yesterday---should have gone to [facility]  (goes in pill packs) can you fix this please.  Thanks.

## 2024-11-14 ENCOUNTER — OFFICE VISIT (OUTPATIENT)
Dept: INTERNAL MEDICINE CLINIC | Age: 89
End: 2024-11-14

## 2024-11-14 VITALS
HEART RATE: 78 BPM | SYSTOLIC BLOOD PRESSURE: 134 MMHG | WEIGHT: 177.3 LBS | OXYGEN SATURATION: 95 % | DIASTOLIC BLOOD PRESSURE: 68 MMHG | BODY MASS INDEX: 28.49 KG/M2 | HEIGHT: 66 IN

## 2024-11-14 DIAGNOSIS — I48.0 PAROXYSMAL ATRIAL FIBRILLATION (HCC): ICD-10-CM

## 2024-11-14 DIAGNOSIS — N18.31 STAGE 3A CHRONIC KIDNEY DISEASE (HCC): ICD-10-CM

## 2024-11-14 DIAGNOSIS — E78.49 OTHER HYPERLIPIDEMIA: ICD-10-CM

## 2024-11-14 DIAGNOSIS — Z23 NEEDS FLU SHOT: ICD-10-CM

## 2024-11-14 DIAGNOSIS — Z86.73 HISTORY OF STROKE: ICD-10-CM

## 2024-11-14 DIAGNOSIS — E06.3 HYPOTHYROIDISM DUE TO HASHIMOTO'S THYROIDITIS: ICD-10-CM

## 2024-11-14 DIAGNOSIS — E87.5 HYPERKALEMIA: Primary | ICD-10-CM

## 2024-11-14 ASSESSMENT — ENCOUNTER SYMPTOMS
VISUAL CHANGE: 0
HOARSE VOICE: 0

## 2024-11-14 NOTE — PROGRESS NOTES
Fatimah Hernández (:  1934) is a 89 y.o. female,Established patient, here for evaluation of the following chief complaint(s):  3 Month Follow-Up      Assessment & Plan   ASSESSMENT/PLAN:  1. Hyperkalemia  -     Basic Metabolic Panel; Future  2. Stage 3a chronic kidney disease (HCC)  -     CBC with Auto Differential; Future  -     Comprehensive Metabolic Panel; Future  3. Other hyperlipidemia  -     Lipid Panel; Future  4. Hypothyroidism due to Hashimoto's thyroiditis  -     Sedimentation Rate; Future  -     C-Reactive Protein; Future  -     TSH; Future  -     T4, Free; Future  5. Paroxysmal atrial fibrillation (HCC)  6. History of stroke  7. Needs flu shot  -     Influenza, FLUAD Trivalent, (age 65 y+), IM, Preservative Free, 0.5mL  Patient potassium is elevated on her last blood test and that she is taking supplements at home so we discussed the importance of keeping it controlled and she is jaime discontinue that for now    Profile reevaluated prior to her next visit blood test will be ordered    Patient is advised to drink more fluid her kidney function was borderline but otherwise she is active and doing very well and her blood pressure is very well-controlled    Return in about 3 months (around 2025).         Subjective   SUBJECTIVE/OBJECTIVE:    Lab Review   Lab Results   Component Value Date/Time     2024 12:19 PM     2024 01:39 PM     2023 02:27 PM    K 5.9 2024 12:19 PM    K 5.2 2024 01:39 PM    K 5.2 2023 02:27 PM    CO2 24 2024 12:19 PM    CO2 25 2024 01:39 PM    CO2 26 2023 02:27 PM    BUN 26 2024 12:19 PM    BUN 21 2024 01:39 PM    BUN 15 2023 02:27 PM    CREATININE 1.1 2024 12:19 PM    CREATININE 0.9 2024 01:39 PM    CREATININE 1.2 2023 02:27 PM    GLUCOSE 124 2024 12:19 PM    GLUCOSE 86 2024 01:39 PM    GLUCOSE 98 2023 02:27 PM    CALCIUM 9.6 2024 12:19 PM

## 2024-11-20 ENCOUNTER — TELEPHONE (OUTPATIENT)
Dept: INTERNAL MEDICINE CLINIC | Age: 89
End: 2024-11-20

## 2024-11-20 DIAGNOSIS — E55.9 VITAMIN D DEFICIENCY: ICD-10-CM

## 2024-11-20 DIAGNOSIS — F41.9 ANXIETY: ICD-10-CM

## 2024-11-20 NOTE — TELEPHONE ENCOUNTER
Pt  calling requesting refill of Citalapram (8/21/24) and vitamin D (7/23/24)     Last written see above  Last OV 11/14/24  Next OV 2/24/25  Last recommended OV NA     Please send to [facility]

## 2024-11-21 ENCOUNTER — TELEPHONE (OUTPATIENT)
Dept: INTERNAL MEDICINE CLINIC | Age: 89
End: 2024-11-21

## 2024-11-21 DIAGNOSIS — E55.9 VITAMIN D DEFICIENCY: ICD-10-CM

## 2024-11-21 DIAGNOSIS — F41.9 ANXIETY: ICD-10-CM

## 2024-11-21 RX ORDER — CITALOPRAM HYDROBROMIDE 20 MG/1
20 TABLET ORAL DAILY
Qty: 90 TABLET | Refills: 0 | Status: SHIPPED | OUTPATIENT
Start: 2024-11-21 | End: 2024-11-21 | Stop reason: SDUPTHER

## 2024-11-21 RX ORDER — ERGOCALCIFEROL 1.25 MG/1
CAPSULE, LIQUID FILLED ORAL
Qty: 12 CAPSULE | Refills: 0 | Status: SHIPPED | OUTPATIENT
Start: 2024-11-21

## 2024-11-21 RX ORDER — ERGOCALCIFEROL 1.25 MG/1
CAPSULE, LIQUID FILLED ORAL
Qty: 12 CAPSULE | Refills: 0 | Status: SHIPPED | OUTPATIENT
Start: 2024-11-21 | End: 2024-11-21 | Stop reason: SDUPTHER

## 2024-11-21 RX ORDER — CITALOPRAM HYDROBROMIDE 20 MG/1
20 TABLET ORAL DAILY
Qty: 90 TABLET | Refills: 1 | Status: SHIPPED | OUTPATIENT
Start: 2024-11-21

## 2024-11-21 NOTE — TELEPHONE ENCOUNTER
Pharmacy calling, medication should have been sent to [facility]. Please advise and send to correct pharmacy.

## 2024-12-12 NOTE — PROGRESS NOTES
use: Yes     Comment: social    Drug use: Never    Sexual activity: Not on file   Other Topics Concern    Not on file   Social History Narrative    Not on file     Social Determinants of Health     Financial Resource Strain: Low Risk     Difficulty of Paying Living Expenses: Not hard at all   Food Insecurity: No Food Insecurity    Worried About Running Out of Food in the Last Year: Never true    Ran Out of Food in the Last Year: Never true   Transportation Needs: Unknown    Lack of Transportation (Medical): Not on file    Lack of Transportation (Non-Medical): No   Physical Activity: Inactive    Days of Exercise per Week: 0 days    Minutes of Exercise per Session: 0 min   Stress: Not on file   Social Connections: Not on file   Intimate Partner Violence: Not on file   Housing Stability: Unknown    Unable to Pay for Housing in the Last Year: Not on file    Number of Places Lived in the Last Year: Not on file    Unstable Housing in the Last Year: No       Family History:   Son  of angioedema   Brother with AF (also my pt)    Review of Systems:   Constitutional: there has been no unanticipated weight loss. No change in energy or activity level   Eyes: No visual changes   ENT: No Headaches, hearing loss or vertigo. No mouth sores or sore throat.  Cardiovascular: Reviewed in HPI  Respiratory: No cough or wheezing, no sputum production.   Gastrointestinal: No abdominal pain, appetite loss, blood in stools. No change in bowel or bladder habits.  Genitourinary: No nocturia, dysuria, trouble voiding  Musculoskeletal:  No gait disturbance, weakness or joint complaints.  Integumentary: No rash or pruritis.  Neurological: No headache, change in muscle strength, numbness or tingling. No change in gait, balance, coordination, mood, affect, memory, mentation, behavior.  Psychiatric: No anxiety or depression  Endocrine: No malaise or fever  Hematologic/Lymphatic: No abnormal bruising or bleeding, blood clots or swollen lymph

## 2024-12-19 ENCOUNTER — TELEPHONE (OUTPATIENT)
Dept: INTERNAL MEDICINE CLINIC | Age: 88
End: 2024-12-19

## 2024-12-19 DIAGNOSIS — E06.3 HYPOTHYROIDISM DUE TO HASHIMOTO'S THYROIDITIS: ICD-10-CM

## 2024-12-19 RX ORDER — LEVOTHYROXINE SODIUM 50 UG/1
50 TABLET ORAL DAILY
Qty: 90 TABLET | Refills: 1 | Status: SHIPPED | OUTPATIENT
Start: 2024-12-19

## 2024-12-19 NOTE — TELEPHONE ENCOUNTER
Pharmacy calling requesting refill of levothyroxine (SYNTHROID) 50 MCG tablet     Last written 5/23/24  Last OV 11/14/24  Next OV 2/24/25    Please send to [facility]

## 2024-12-31 ENCOUNTER — OFFICE VISIT (OUTPATIENT)
Dept: CARDIOLOGY CLINIC | Age: 88
End: 2024-12-31
Payer: MEDICARE

## 2024-12-31 VITALS
WEIGHT: 180 LBS | HEART RATE: 69 BPM | SYSTOLIC BLOOD PRESSURE: 132 MMHG | BODY MASS INDEX: 28.93 KG/M2 | OXYGEN SATURATION: 96 % | DIASTOLIC BLOOD PRESSURE: 60 MMHG | HEIGHT: 66 IN

## 2024-12-31 DIAGNOSIS — R06.02 SHORTNESS OF BREATH: ICD-10-CM

## 2024-12-31 DIAGNOSIS — Z01.810 PRE-OPERATIVE CARDIOVASCULAR EXAMINATION: ICD-10-CM

## 2024-12-31 DIAGNOSIS — I10 HYPERTENSION, UNSPECIFIED TYPE: ICD-10-CM

## 2024-12-31 DIAGNOSIS — I48.0 PAROXYSMAL ATRIAL FIBRILLATION (HCC): Primary | ICD-10-CM

## 2024-12-31 DIAGNOSIS — E78.49 OTHER HYPERLIPIDEMIA: ICD-10-CM

## 2024-12-31 PROCEDURE — 1159F MED LIST DOCD IN RCRD: CPT | Performed by: INTERNAL MEDICINE

## 2024-12-31 PROCEDURE — 99214 OFFICE O/P EST MOD 30 MIN: CPT | Performed by: INTERNAL MEDICINE

## 2024-12-31 PROCEDURE — 1123F ACP DISCUSS/DSCN MKR DOCD: CPT | Performed by: INTERNAL MEDICINE

## 2025-01-20 ENCOUNTER — TELEPHONE (OUTPATIENT)
Dept: INTERNAL MEDICINE CLINIC | Age: 89
End: 2025-01-20

## 2025-01-20 RX ORDER — ATORVASTATIN CALCIUM 80 MG/1
80 TABLET, FILM COATED ORAL DAILY
Qty: 90 TABLET | Refills: 0 | Status: SHIPPED | OUTPATIENT
Start: 2025-01-20 | End: 2025-04-20

## 2025-01-20 RX ORDER — FUROSEMIDE 20 MG/1
TABLET ORAL
Qty: 90 TABLET | Refills: 0 | Status: SHIPPED | OUTPATIENT
Start: 2025-01-20

## 2025-01-20 NOTE — TELEPHONE ENCOUNTER
Pharmacy calling requesting refill of   - atorvastatin (LIPITOR) 80 MG tablet   - furosemide (LASIX) 20 MG tablet     Last written 10/23/24  Last OV 11/14/24  Next OV 2/24/25    Please send to [facility] on [address]

## 2025-01-22 ENCOUNTER — HOSPITAL ENCOUNTER (OUTPATIENT)
Age: 89
Discharge: HOME | End: 2025-01-24
Attending: INTERNAL MEDICINE
Payer: MEDICARE

## 2025-01-22 VITALS — BODY MASS INDEX: 28.12 KG/M2 | HEIGHT: 66 IN | WEIGHT: 175 LBS

## 2025-01-22 VITALS
HEIGHT: 66 IN | SYSTOLIC BLOOD PRESSURE: 131 MMHG | DIASTOLIC BLOOD PRESSURE: 64 MMHG | WEIGHT: 175 LBS | BODY MASS INDEX: 28.12 KG/M2 | HEART RATE: 59 BPM

## 2025-01-22 DIAGNOSIS — Z01.810 PRE-OPERATIVE CARDIOVASCULAR EXAMINATION: ICD-10-CM

## 2025-01-22 DIAGNOSIS — R06.02 SHORTNESS OF BREATH: ICD-10-CM

## 2025-01-22 LAB
ECHO BSA: 1.92 M2
NUC STRESS EJECTION FRACTION: 78 %
NUC STRESS LV EDV: 79 ML (ref 56–104)
NUC STRESS LV ESV: 17 ML (ref 19–49)
NUC STRESS LV MASS: 115 G
STRESS BASELINE DIAS BP: 64 MMHG
STRESS BASELINE HR: 58 BPM
STRESS BASELINE SYS BP: 131 MMHG
STRESS ESTIMATED WORKLOAD: 1 METS
STRESS EXERCISE DUR MIN: 4 MIN
STRESS EXERCISE DUR SEC: 0 SEC
STRESS O2 SAT PEAK: 94 %
STRESS O2 SAT REST: 94 %
STRESS PEAK DIAS BP: 43 MMHG
STRESS PEAK SYS BP: 128 MMHG
STRESS PERCENT HR ACHIEVED: 53 %
STRESS POST PEAK HR: 69 BPM
STRESS RATE PRESSURE PRODUCT: 8832 BPM*MMHG
STRESS TARGET HR: 130 BPM
TID: 0.9

## 2025-01-22 PROCEDURE — 3430000000 HC RX DIAGNOSTIC RADIOPHARMACEUTICAL: Performed by: INTERNAL MEDICINE

## 2025-01-22 PROCEDURE — 93018 CV STRESS TEST I&R ONLY: CPT | Performed by: INTERNAL MEDICINE

## 2025-01-22 PROCEDURE — A9502 TC99M TETROFOSMIN: HCPCS | Performed by: INTERNAL MEDICINE

## 2025-01-22 PROCEDURE — 93017 CV STRESS TEST TRACING ONLY: CPT

## 2025-01-22 PROCEDURE — 6360000002 HC RX W HCPCS: Performed by: INTERNAL MEDICINE

## 2025-01-22 PROCEDURE — 93016 CV STRESS TEST SUPVJ ONLY: CPT | Performed by: INTERNAL MEDICINE

## 2025-01-22 PROCEDURE — 78452 HT MUSCLE IMAGE SPECT MULT: CPT | Performed by: INTERNAL MEDICINE

## 2025-01-22 PROCEDURE — 78452 HT MUSCLE IMAGE SPECT MULT: CPT

## 2025-01-22 RX ORDER — REGADENOSON 0.08 MG/ML
0.4 INJECTION, SOLUTION INTRAVENOUS
Status: COMPLETED | OUTPATIENT
Start: 2025-01-22 | End: 2025-01-22

## 2025-01-22 RX ADMIN — TETROFOSMIN 10.5 MILLICURIE: 1.38 INJECTION, POWDER, LYOPHILIZED, FOR SOLUTION INTRAVENOUS at 13:06

## 2025-01-22 RX ADMIN — REGADENOSON 0.4 MG: 0.08 INJECTION, SOLUTION INTRAVENOUS at 13:58

## 2025-01-22 RX ADMIN — TETROFOSMIN 33.4 MILLICURIE: 1.38 INJECTION, POWDER, LYOPHILIZED, FOR SOLUTION INTRAVENOUS at 14:00

## 2025-01-28 ENCOUNTER — TELEPHONE (OUTPATIENT)
Dept: CARDIOLOGY CLINIC | Age: 89
End: 2025-01-28

## 2025-01-28 NOTE — TELEPHONE ENCOUNTER
Pt called stating they need to speak to [facility] staff about the Stress Test results from test done on 1/22    Pls advise

## 2025-01-28 NOTE — TELEPHONE ENCOUNTER
I spoke w/ Fatimah. She states Dr Foster reviewed stress test with her and told her it was fine, but albaro says it is abnormal. I reviewed normal stress test findings with her. She is asking if she is clear for knee replacement surgery from a cardiac standpoint. She is seeing Dr Saini 1/29/25. Surgery is not scheduled yet.

## 2025-01-29 NOTE — TELEPHONE ENCOUNTER
The test is normal and it says so.  Please ask her to read what is \"abnormal\" . She is cleared for knee replacement if she wishes.

## 2025-01-29 NOTE — TELEPHONE ENCOUNTER
I spoke w/ Fatimah and relayed Dr Razo's response to her. She verbalized understanding. She states she sees Dr Saini on Friday. I advised her to let him know she sees Dr Razo and a letter can be sent to him with clearance

## 2025-02-18 DIAGNOSIS — E78.49 OTHER HYPERLIPIDEMIA: ICD-10-CM

## 2025-02-18 DIAGNOSIS — E87.5 HYPERKALEMIA: ICD-10-CM

## 2025-02-18 DIAGNOSIS — N18.31 STAGE 3A CHRONIC KIDNEY DISEASE (HCC): ICD-10-CM

## 2025-02-18 DIAGNOSIS — E06.3 HYPOTHYROIDISM DUE TO HASHIMOTO'S THYROIDITIS: ICD-10-CM

## 2025-02-18 LAB
ALBUMIN SERPL-MCNC: 4.3 G/DL (ref 3.4–5)
ALBUMIN/GLOB SERPL: 1.9 {RATIO} (ref 1.1–2.2)
ALP SERPL-CCNC: 72 U/L (ref 40–129)
ALT SERPL-CCNC: 15 U/L (ref 10–40)
ANION GAP SERPL CALCULATED.3IONS-SCNC: 12 MMOL/L (ref 3–16)
AST SERPL-CCNC: 26 U/L (ref 15–37)
BASOPHILS # BLD: 0 K/UL (ref 0–0.2)
BASOPHILS NFR BLD: 0.3 %
BILIRUB SERPL-MCNC: 1.1 MG/DL (ref 0–1)
BUN SERPL-MCNC: 24 MG/DL (ref 7–20)
CALCIUM SERPL-MCNC: 9.2 MG/DL (ref 8.3–10.6)
CHLORIDE SERPL-SCNC: 95 MMOL/L (ref 99–110)
CHOLEST SERPL-MCNC: 163 MG/DL (ref 0–199)
CO2 SERPL-SCNC: 25 MMOL/L (ref 21–32)
CREAT SERPL-MCNC: 1 MG/DL (ref 0.6–1.2)
CRP SERPL-MCNC: <3 MG/L (ref 0–5.1)
DEPRECATED RDW RBC AUTO: 13.2 % (ref 12.4–15.4)
EOSINOPHIL # BLD: 0.1 K/UL (ref 0–0.6)
EOSINOPHIL NFR BLD: 2.2 %
ERYTHROCYTE [SEDIMENTATION RATE] IN BLOOD BY WESTERGREN METHOD: 14 MM/HR (ref 0–30)
GFR SERPLBLD CREATININE-BSD FMLA CKD-EPI: 53 ML/MIN/{1.73_M2}
GLUCOSE SERPL-MCNC: 93 MG/DL (ref 70–99)
HCT VFR BLD AUTO: 31.7 % (ref 36–48)
HDLC SERPL-MCNC: 76 MG/DL (ref 40–60)
HGB BLD-MCNC: 10.7 G/DL (ref 12–16)
LDLC SERPL CALC-MCNC: 71 MG/DL
LYMPHOCYTES # BLD: 1.4 K/UL (ref 1–5.1)
LYMPHOCYTES NFR BLD: 20.7 %
MCH RBC QN AUTO: 34 PG (ref 26–34)
MCHC RBC AUTO-ENTMCNC: 33.8 G/DL (ref 31–36)
MCV RBC AUTO: 100.4 FL (ref 80–100)
MONOCYTES # BLD: 0.5 K/UL (ref 0–1.3)
MONOCYTES NFR BLD: 7.9 %
NEUTROPHILS # BLD: 4.6 K/UL (ref 1.7–7.7)
NEUTROPHILS NFR BLD: 68.9 %
PLATELET # BLD AUTO: 274 K/UL (ref 135–450)
PMV BLD AUTO: 8.5 FL (ref 5–10.5)
POTASSIUM SERPL-SCNC: 4.8 MMOL/L (ref 3.5–5.1)
PROT SERPL-MCNC: 6.6 G/DL (ref 6.4–8.2)
RBC # BLD AUTO: 3.16 M/UL (ref 4–5.2)
SODIUM SERPL-SCNC: 132 MMOL/L (ref 136–145)
T4 FREE SERPL-MCNC: 1.2 NG/DL (ref 0.9–1.8)
TRIGL SERPL-MCNC: 78 MG/DL (ref 0–150)
TSH SERPL DL<=0.005 MIU/L-ACNC: 4.09 UIU/ML (ref 0.27–4.2)
VLDLC SERPL CALC-MCNC: 16 MG/DL
WBC # BLD AUTO: 6.6 K/UL (ref 4–11)

## 2025-02-19 ENCOUNTER — TELEPHONE (OUTPATIENT)
Dept: INTERNAL MEDICINE CLINIC | Age: 89
End: 2025-02-19

## 2025-02-19 DIAGNOSIS — E55.9 VITAMIN D DEFICIENCY: ICD-10-CM

## 2025-02-19 RX ORDER — ERGOCALCIFEROL 1.25 MG/1
CAPSULE, LIQUID FILLED ORAL
Qty: 12 CAPSULE | Refills: 0 | Status: SHIPPED | OUTPATIENT
Start: 2025-02-19

## 2025-02-19 NOTE — TELEPHONE ENCOUNTER
Pharmacy calling requesting refill of   vitamin D (ERGOCALCIFEROL) 1.25 MG (64143 UT) CAPS capsule     Last written 11/21/24  Last OV 11/14/24  Next OV 02/24/25    Please send to Pharmacy on file

## 2025-02-24 ENCOUNTER — OFFICE VISIT (OUTPATIENT)
Dept: INTERNAL MEDICINE CLINIC | Age: 89
End: 2025-02-24
Payer: MEDICARE

## 2025-02-24 VITALS
BODY MASS INDEX: 29.09 KG/M2 | DIASTOLIC BLOOD PRESSURE: 64 MMHG | OXYGEN SATURATION: 96 % | WEIGHT: 181 LBS | HEART RATE: 67 BPM | HEIGHT: 66 IN | SYSTOLIC BLOOD PRESSURE: 104 MMHG

## 2025-02-24 DIAGNOSIS — N18.31 STAGE 3A CHRONIC KIDNEY DISEASE (HCC): ICD-10-CM

## 2025-02-24 DIAGNOSIS — G89.29 CHRONIC PAIN OF LEFT KNEE: Primary | ICD-10-CM

## 2025-02-24 DIAGNOSIS — I10 HYPERTENSION, UNSPECIFIED TYPE: ICD-10-CM

## 2025-02-24 DIAGNOSIS — I48.0 PAROXYSMAL ATRIAL FIBRILLATION (HCC): ICD-10-CM

## 2025-02-24 DIAGNOSIS — I10 PRIMARY HYPERTENSION: ICD-10-CM

## 2025-02-24 DIAGNOSIS — M35.3 POLYMYALGIA RHEUMATICA SYNDROME: ICD-10-CM

## 2025-02-24 DIAGNOSIS — Z00.00 MEDICARE ANNUAL WELLNESS VISIT, SUBSEQUENT: ICD-10-CM

## 2025-02-24 DIAGNOSIS — M25.562 CHRONIC PAIN OF LEFT KNEE: Primary | ICD-10-CM

## 2025-02-24 PROCEDURE — 1123F ACP DISCUSS/DSCN MKR DOCD: CPT | Performed by: INTERNAL MEDICINE

## 2025-02-24 PROCEDURE — 1159F MED LIST DOCD IN RCRD: CPT | Performed by: INTERNAL MEDICINE

## 2025-02-24 PROCEDURE — 99214 OFFICE O/P EST MOD 30 MIN: CPT | Performed by: INTERNAL MEDICINE

## 2025-02-24 PROCEDURE — 1160F RVW MEDS BY RX/DR IN RCRD: CPT | Performed by: INTERNAL MEDICINE

## 2025-02-24 PROCEDURE — G0439 PPPS, SUBSEQ VISIT: HCPCS | Performed by: INTERNAL MEDICINE

## 2025-02-24 SDOH — ECONOMIC STABILITY: FOOD INSECURITY: WITHIN THE PAST 12 MONTHS, YOU WORRIED THAT YOUR FOOD WOULD RUN OUT BEFORE YOU GOT MONEY TO BUY MORE.: NEVER TRUE

## 2025-02-24 SDOH — ECONOMIC STABILITY: FOOD INSECURITY: WITHIN THE PAST 12 MONTHS, THE FOOD YOU BOUGHT JUST DIDN'T LAST AND YOU DIDN'T HAVE MONEY TO GET MORE.: NEVER TRUE

## 2025-02-24 ASSESSMENT — PATIENT HEALTH QUESTIONNAIRE - PHQ9
SUM OF ALL RESPONSES TO PHQ QUESTIONS 1-9: 0
SUM OF ALL RESPONSES TO PHQ QUESTIONS 1-9: 0
2. FEELING DOWN, DEPRESSED OR HOPELESS: NOT AT ALL
1. LITTLE INTEREST OR PLEASURE IN DOING THINGS: NOT AT ALL
SUM OF ALL RESPONSES TO PHQ9 QUESTIONS 1 & 2: 0
SUM OF ALL RESPONSES TO PHQ QUESTIONS 1-9: 0
SUM OF ALL RESPONSES TO PHQ QUESTIONS 1-9: 0

## 2025-02-24 ASSESSMENT — ENCOUNTER SYMPTOMS
COLOR CHANGE: 0
BLOOD IN STOOL: 0
VISUAL CHANGE: 0
WHEEZING: 0
COUGH: 0
SINUS PAIN: 0
HOARSE VOICE: 0
CHEST TIGHTNESS: 0
CONSTIPATION: 0
SHORTNESS OF BREATH: 0
ABDOMINAL PAIN: 0

## 2025-02-24 ASSESSMENT — LIFESTYLE VARIABLES
HOW MANY STANDARD DRINKS CONTAINING ALCOHOL DO YOU HAVE ON A TYPICAL DAY: 1 OR 2
HOW OFTEN DURING THE LAST YEAR HAVE YOU FOUND THAT YOU WERE NOT ABLE TO STOP DRINKING ONCE YOU HAD STARTED: NEVER
HOW OFTEN DURING THE LAST YEAR HAVE YOU BEEN UNABLE TO REMEMBER WHAT HAPPENED THE NIGHT BEFORE BECAUSE YOU HAD BEEN DRINKING: NEVER
HOW OFTEN DURING THE LAST YEAR HAVE YOU FAILED TO DO WHAT WAS NORMALLY EXPECTED FROM YOU BECAUSE OF DRINKING: NEVER
HAVE YOU OR SOMEONE ELSE BEEN INJURED AS A RESULT OF YOUR DRINKING: NO
HAS A RELATIVE, FRIEND, DOCTOR, OR ANOTHER HEALTH PROFESSIONAL EXPRESSED CONCERN ABOUT YOUR DRINKING OR SUGGESTED YOU CUT DOWN: NO
HOW OFTEN DURING THE LAST YEAR HAVE YOU HAD A FEELING OF GUILT OR REMORSE AFTER DRINKING: NEVER
HOW OFTEN DO YOU HAVE A DRINK CONTAINING ALCOHOL: 4 OR MORE TIMES A WEEK
HOW OFTEN DURING THE LAST YEAR HAVE YOU NEEDED AN ALCOHOLIC DRINK FIRST THING IN THE MORNING TO GET YOURSELF GOING AFTER A NIGHT OF HEAVY DRINKING: NEVER

## 2025-02-24 NOTE — PROGRESS NOTES
Health Risk Assessment and screening values have been reviewed and are found in Flowsheets. The following problems were reviewed today and where indicated follow up appointments were made and/or referrals ordered.    Positive Risk Factor Screenings with Interventions:    Fall Risk:  Do you feel unsteady or are you worried about falling? : (!) yes  2 or more falls in past year?: (!) yes  Fall with injury in past year?: (!) yes     Interventions:    Reviewed medications, home hazards, visual acuity, and co-morbidities that can increase risk for falls             Inactivity:  On average, how many days per week do you engage in moderate to strenuous exercise (like a brisk walk)?: 0 days (!) Abnormal  On average, how many minutes do you engage in exercise at this level?: 0 min  Interventions:  Recommendations: patient agrees to exercise for at least 150 minutes/week                         Objective   Vitals:    02/24/25 1318   BP: 104/64   Pulse: 67   SpO2: 96%   Weight: 82.1 kg (181 lb)   Height: 1.676 m (5' 6\")      Body mass index is 29.21 kg/m².        General Appearance: alert and oriented to person, place and time, well developed and well- nourished, in no acute distress  Skin: warm and dry, no rash or erythema  Head: normocephalic and atraumatic  Eyes: pupils equal, round, and reactive to light, extraocular eye movements intact, conjunctivae normal  ENT: tympanic membrane, external ear and ear canal normal bilaterally, nose without deformity, nasal mucosa and turbinates normal without polyps  Neck: supple and non-tender without mass, no thyromegaly or thyroid nodules, no cervical lymphadenopathy  Pulmonary/Chest: clear to auscultation bilaterally- no wheezes, rales or rhonchi, normal air movement, no respiratory distress  Cardiovascular: normal rate, regular rhythm, normal S1 and S2, no murmurs, rubs, clicks, or gallops, distal pulses intact, no carotid bruits  Abdomen: soft, non-tender, non-distended, normal

## 2025-03-03 ENCOUNTER — TELEPHONE (OUTPATIENT)
Dept: CARDIOLOGY CLINIC | Age: 89
End: 2025-03-03

## 2025-03-03 NOTE — TELEPHONE ENCOUNTER
Patient is asking that someone call her from [facility] office to let her know if she can stop taking her Eliquis and when she should stop #[phone] or [phone], she asks we try both phone numbers      CARDIAC CLEARANCE   What type of procedure are you having?  Left knee replacement    Which physician is performing your procedure?  Dr. Saini    When is your procedure scheduled for?  3/12/25    Where are you having this procedure?  [facility]    Are you taking Blood Thinners?   If so what? (Name/dose/frequesncy)  Eliquis   Does the surgeon want you to stop your blood thinner?  If so for how long?  Patient uncertain, wants to know what [facility] says  Phone Number and Contact Name for Physicians office:  [phone]    Fax number to send information:  Patient is asking release be pur on documistic

## 2025-03-03 NOTE — TELEPHONE ENCOUNTER
Last Office Visit: 12/31/2024 [facility]    Next Office Visit: 06/29/2025 [facility]       RECALL LIST

## 2025-03-03 NOTE — TELEPHONE ENCOUNTER
I called and spoke to Fatimah and notified that [facility] is out of the office today and will reach out to her tomorrow with instructions.  She verbalized understanding and denied any questions and/or concerns.

## 2025-03-04 NOTE — TELEPHONE ENCOUNTER
I spoke to pt and gave instructions. A letter was faxed to [facility] and confirmation received.

## 2025-03-05 ENCOUNTER — TELEPHONE (OUTPATIENT)
Dept: INTERNAL MEDICINE CLINIC | Age: 89
End: 2025-03-05

## 2025-03-05 NOTE — TELEPHONE ENCOUNTER
Pt calling--had blood work done for her surgery yesterday at [facility] said you wanted to see so you could clear her for her surgery 3/12---Thanks.

## 2025-03-20 ENCOUNTER — TELEPHONE (OUTPATIENT)
Dept: INTERNAL MEDICINE CLINIC | Age: 89
End: 2025-03-20

## 2025-03-20 NOTE — TELEPHONE ENCOUNTER
Care Transitions Initial Follow Up Call    Call within 2 business days of discharge: Yes     Patient: Fatimah Hernández Patient : 10/25/1934 MRN: [id number]    [unfilled]    RARS: No data recorded     Spoke with: Marla     Discharge department/facility: Home     Non-face-to-face services provided:  Scheduled appointment with PCP-3/25/25    Follow Up  Future Appointments   Date Time Provider Department Center   3/25/2025  1:00 PM Alexander Day MD [facility]       Radha Anderson MA

## 2025-03-24 ENCOUNTER — CARE COORDINATION (OUTPATIENT)
Dept: CASE MANAGEMENT | Age: 89
End: 2025-03-24

## 2025-03-24 DIAGNOSIS — Z96.652 STATUS POST TOTAL LEFT KNEE REPLACEMENT: Primary | ICD-10-CM

## 2025-03-24 PROCEDURE — 1111F DSCHRG MED/CURRENT MED MERGE: CPT | Performed by: INTERNAL MEDICINE

## 2025-03-24 NOTE — CARE COORDINATION
Care Transitions Note    Initial Call - Call within 2 business days of discharge: Yes    Patient Current Location:  Home: [address] [place] [id number]    Post Acute Care Manager contacted the patient by telephone to perform post hospital discharge assessment, verified name and  as identifiers.  Provided introduction to self, and explanation of the Post Acute Care Manager role.    Patient: Fatimah Hernández    Patient : 10/25/1934   MRN: [id number]    Reason for Admission: L total knne  Discharge Date 3/14/25  RURS: No data recorded    Last Discharge Facility       Date Complaint Diagnosis Description Type Department Provider    3/14 to 3/21  3L total Knee by Dr David Saini  [facility]  MD JOEY            Was this an external facility discharge?  3 /21    Additional needs identified to be addressed with provider   No needs identified             Method of communication with provider: none.    Patients top risk factors for readmission: medical condition-L total knee    Interventions to address risk factors:   Review of patient management of conditions/medications:      Care Summary Note: Spoke with Fatimah after 2 IDs. She is \"coming along tayler well\". She called [facility] today and they are working on it. She sees ortho on the . And PCP next day. She is eating and sleeping well. Her daughter is currentl staying with her to help her. She is taking Senna and Miralax  and educated on constipation. Reviewed meds. Declined phone calls as her needs are met. Left my contact information.    Post Acute Care Manager reviewed red flags with patient. The patient was given an opportunity to ask questions; all questions answered at this time.. The patient verbalized understanding.   Were discharge instructions available to patient? Yes.   Reviewed appropriate site of care based on symptoms and resources available to patient including: PCP  Specialist. The patient agrees to contact the primary care provider

## 2025-03-27 ENCOUNTER — OFFICE VISIT (OUTPATIENT)
Dept: INTERNAL MEDICINE CLINIC | Age: 89
End: 2025-03-27

## 2025-03-27 VITALS
BODY MASS INDEX: 28.64 KG/M2 | DIASTOLIC BLOOD PRESSURE: 82 MMHG | OXYGEN SATURATION: 98 % | HEART RATE: 76 BPM | HEIGHT: 66 IN | WEIGHT: 178.2 LBS | SYSTOLIC BLOOD PRESSURE: 132 MMHG

## 2025-03-27 DIAGNOSIS — R35.0 FREQUENCY OF MICTURITION: ICD-10-CM

## 2025-03-27 DIAGNOSIS — Z09 HOSPITAL DISCHARGE FOLLOW-UP: ICD-10-CM

## 2025-03-27 DIAGNOSIS — I10 HYPERTENSION, UNSPECIFIED TYPE: ICD-10-CM

## 2025-03-27 DIAGNOSIS — I67.9 CVD (CEREBROVASCULAR DISEASE): ICD-10-CM

## 2025-03-27 DIAGNOSIS — Z09 HOSPITAL DISCHARGE FOLLOW-UP: Primary | ICD-10-CM

## 2025-03-27 LAB
ANION GAP SERPL CALCULATED.3IONS-SCNC: 15 MMOL/L (ref 3–16)
BASOPHILS # BLD: 0.1 K/UL (ref 0–0.2)
BASOPHILS NFR BLD: 0.4 %
BILIRUBIN, POC: NEGATIVE
BLOOD URINE, POC: NEGATIVE
BUN SERPL-MCNC: 20 MG/DL (ref 7–20)
CALCIUM SERPL-MCNC: 9.3 MG/DL (ref 8.3–10.6)
CHLORIDE SERPL-SCNC: 91 MMOL/L (ref 99–110)
CLARITY, POC: CLEAR
CO2 SERPL-SCNC: 23 MMOL/L (ref 21–32)
COLOR, POC: YELLOW
CREAT SERPL-MCNC: 1 MG/DL (ref 0.6–1.2)
DEPRECATED RDW RBC AUTO: 14 % (ref 12.4–15.4)
EOSINOPHIL # BLD: 0 K/UL (ref 0–0.6)
EOSINOPHIL NFR BLD: 0.1 %
GFR SERPLBLD CREATININE-BSD FMLA CKD-EPI: 53 ML/MIN/{1.73_M2}
GLUCOSE SERPL-MCNC: 115 MG/DL (ref 70–99)
GLUCOSE URINE, POC: NEGATIVE MG/DL
HCT VFR BLD AUTO: 26.1 % (ref 36–48)
HGB BLD-MCNC: 8.4 G/DL (ref 12–16)
KETONES, POC: NEGATIVE MG/DL
LEUKOCYTE EST, POC: NORMAL
LYMPHOCYTES # BLD: 0.9 K/UL (ref 1–5.1)
LYMPHOCYTES NFR BLD: 7.1 %
MCH RBC QN AUTO: 31.9 PG (ref 26–34)
MCHC RBC AUTO-ENTMCNC: 32.1 G/DL (ref 31–36)
MCV RBC AUTO: 99.5 FL (ref 80–100)
MONOCYTES # BLD: 0.6 K/UL (ref 0–1.3)
MONOCYTES NFR BLD: 4.5 %
NEUTROPHILS # BLD: 11.1 K/UL (ref 1.7–7.7)
NEUTROPHILS NFR BLD: 87.9 %
NITRITE, POC: NEGATIVE
PH, POC: 7
PLATELET # BLD AUTO: 565 K/UL (ref 135–450)
PMV BLD AUTO: 7.1 FL (ref 5–10.5)
POTASSIUM SERPL-SCNC: 4.7 MMOL/L (ref 3.5–5.1)
PROTEIN, POC: NEGATIVE MG/DL
RBC # BLD AUTO: 2.62 M/UL (ref 4–5.2)
SODIUM SERPL-SCNC: 129 MMOL/L (ref 136–145)
SPECIFIC GRAVITY, POC: 1.02
UROBILINOGEN, POC: NORMAL MG/DL
WBC # BLD AUTO: 12.6 K/UL (ref 4–11)

## 2025-03-27 RX ORDER — METHOCARBAMOL 750 MG/1
750 TABLET, FILM COATED ORAL 3 TIMES DAILY PRN
COMMUNITY
Start: 2025-03-13

## 2025-03-27 ASSESSMENT — ENCOUNTER SYMPTOMS: SHORTNESS OF BREATH: 0

## 2025-03-27 NOTE — PROGRESS NOTES
Post-Discharge Transitional Care Follow Up      Fatimah Hernández   YOB: 1934    Date of Office Visit:  3/27/2025  Date of Hospital Admission: 1/22/24  Date of Hospital Discharge: 1/22/24  Readmission Risk Score (high >=14%. Medium >=10%):No data recorded    Care management risk score Rising risk (score 2-5) and Complex Care (Scores >=6): No Risk Score On File     Non face to face  following discharge, date last encounter closed (first attempt may have been earlier): 03/24/2025     Call initiated 2 business days of discharge: Yes     Hospital discharge follow-up  -     [place] DISCHARGE MEDS RECONCILED W/ CURRENT OUTPATIENT MED LIST  -     [place] DISCHARGE MEDS RECONCILED W/ CURRENT OUTPATIENT MED LIST  -     Basic Metabolic Panel; Future  -     CBC with Auto Differential; Future  CVD (cerebrovascular disease)  Hypertension, unspecified type  Frequency of micturition  -     POCT Urinalysis no Micro      Medical Decision Making: moderate complexity  Return in 3 months (on 6/27/2025).       Reviewed patient posthospital and rehab care the patient was hospitalized for a total hip replacement the left side after that she went to [facility] her rehab facility where she stayed for a week and was discharged on the 21 of March, patient is coming today for her hospital follow-up    At this point the patient is having frequency we discussed the possibility of overactive bladder versus infection and we will check a urine analysis today if it is negative we will monitor it we discussed the potential of treating for overactive bladder and she will let me know if her symptoms are bad enough    Patient blood pressure is well-controlled we will check her chemistry and blood counts    Subjective:   Hypertension  This is a chronic problem. The current episode started more than 1 year ago. The problem has been waxing and waning since onset. The problem is controlled. Pertinent negatives include no peripheral edema, PND or  Per OV note: 6/06/2016    4.   Insomnia-Ambien when necessary

## 2025-03-28 ENCOUNTER — RESULTS FOLLOW-UP (OUTPATIENT)
Dept: INTERNAL MEDICINE CLINIC | Age: 89
End: 2025-03-28

## 2025-03-28 DIAGNOSIS — D64.9 NORMOCHROMIC ANEMIA: Primary | ICD-10-CM

## 2025-03-28 NOTE — RESULT ENCOUNTER NOTE
Please let the patient know that results show her sodium has dropped even further advised the patient to hydrate and we will repeat it she may need to be started on salt tablets to try and correct this and she has been slowly dropping over the last year    Also noted the patient hemoglobin has dropped from the 10 range to 8 I will need patient to repeat that test with some iron levels and vitamins to decide on the next step    Orders has been placed in the chart

## 2025-04-02 ENCOUNTER — TELEPHONE (OUTPATIENT)
Dept: CARDIOLOGY CLINIC | Age: 89
End: 2025-04-02

## 2025-04-02 ENCOUNTER — APPOINTMENT (OUTPATIENT)
Dept: CT IMAGING | Age: 89
DRG: 641 | End: 2025-04-02
Payer: MEDICARE

## 2025-04-02 ENCOUNTER — APPOINTMENT (OUTPATIENT)
Dept: GENERAL RADIOLOGY | Age: 89
DRG: 641 | End: 2025-04-02
Payer: MEDICARE

## 2025-04-02 ENCOUNTER — HOSPITAL ENCOUNTER (INPATIENT)
Age: 89
LOS: 1 days | Discharge: HOME | DRG: 641 | End: 2025-04-05
Attending: EMERGENCY MEDICINE | Admitting: INTERNAL MEDICINE
Payer: MEDICARE

## 2025-04-02 DIAGNOSIS — R06.09 DYSPNEA ON EXERTION: Primary | ICD-10-CM

## 2025-04-02 DIAGNOSIS — R06.02 SHORTNESS OF BREATH: ICD-10-CM

## 2025-04-02 DIAGNOSIS — R82.81 PYURIA: ICD-10-CM

## 2025-04-02 DIAGNOSIS — E83.42 HYPOMAGNESEMIA: ICD-10-CM

## 2025-04-02 DIAGNOSIS — I45.10 RIGHT BUNDLE BRANCH BLOCK: ICD-10-CM

## 2025-04-02 DIAGNOSIS — E87.1 HYPONATREMIA: ICD-10-CM

## 2025-04-02 DIAGNOSIS — R22.2 PARASPINAL MASS: ICD-10-CM

## 2025-04-02 LAB
ALBUMIN SERPL-MCNC: 3.5 G/DL (ref 3.4–5)
ALBUMIN/GLOB SERPL: 1.2 {RATIO} (ref 1.1–2.2)
ALP SERPL-CCNC: 101 U/L (ref 40–129)
ALT SERPL-CCNC: 13 U/L (ref 10–40)
ANION GAP SERPL CALCULATED.3IONS-SCNC: 10 MMOL/L (ref 3–16)
AST SERPL-CCNC: 22 U/L (ref 15–37)
BACTERIA URNS QL MICRO: ABNORMAL /HPF
BASOPHILS # BLD: 0 K/UL (ref 0–0.2)
BASOPHILS NFR BLD: 0.2 %
BILIRUB SERPL-MCNC: 0.8 MG/DL (ref 0–1)
BILIRUB UR QL STRIP.AUTO: NEGATIVE
BUN SERPL-MCNC: 10 MG/DL (ref 7–20)
CALCIUM SERPL-MCNC: 9.2 MG/DL (ref 8.3–10.6)
CHLORIDE SERPL-SCNC: 93 MMOL/L (ref 99–110)
CLARITY UR: CLEAR
CO2 SERPL-SCNC: 23 MMOL/L (ref 21–32)
COLOR UR: YELLOW
CREAT SERPL-MCNC: 0.8 MG/DL (ref 0.6–1.2)
DEPRECATED RDW RBC AUTO: 14.7 % (ref 12.4–15.4)
EKG DIAGNOSIS: NORMAL
EKG Q-T INTERVAL: 360 MS
EKG QRS DURATION: 128 MS
EKG QTC CALCULATION (BAZETT): 498 MS
EKG R AXIS: 45 DEGREES
EKG T AXIS: -16 DEGREES
EKG VENTRICULAR RATE: 115 BPM
EOSINOPHIL # BLD: 0.1 K/UL (ref 0–0.6)
EOSINOPHIL NFR BLD: 1.4 %
EPI CELLS #/AREA URNS AUTO: 2 /HPF (ref 0–5)
GFR SERPLBLD CREATININE-BSD FMLA CKD-EPI: 70 ML/MIN/{1.73_M2}
GLUCOSE SERPL-MCNC: 112 MG/DL (ref 70–99)
GLUCOSE UR STRIP.AUTO-MCNC: NEGATIVE MG/DL
HCT VFR BLD AUTO: 29.6 % (ref 36–48)
HGB BLD-MCNC: 9.9 G/DL (ref 12–16)
HGB UR QL STRIP.AUTO: NEGATIVE
HYALINE CASTS #/AREA URNS AUTO: 0 /LPF (ref 0–8)
KETONES UR STRIP.AUTO-MCNC: NEGATIVE MG/DL
LEUKOCYTE ESTERASE UR QL STRIP.AUTO: ABNORMAL
LYMPHOCYTES # BLD: 1.3 K/UL (ref 1–5.1)
LYMPHOCYTES NFR BLD: 17.9 %
MAGNESIUM SERPL-MCNC: 1.74 MG/DL (ref 1.8–2.4)
MCH RBC QN AUTO: 32.9 PG (ref 26–34)
MCHC RBC AUTO-ENTMCNC: 33.6 G/DL (ref 31–36)
MCV RBC AUTO: 98 FL (ref 80–100)
MONOCYTES # BLD: 0.6 K/UL (ref 0–1.3)
MONOCYTES NFR BLD: 8.5 %
NEUTROPHILS # BLD: 5.4 K/UL (ref 1.7–7.7)
NEUTROPHILS NFR BLD: 72 %
NITRITE UR QL STRIP.AUTO: NEGATIVE
NT-PROBNP SERPL-MCNC: 3101 PG/ML (ref 0–449)
PH UR STRIP.AUTO: 7.5 [PH] (ref 5–8)
PLATELET # BLD AUTO: 425 K/UL (ref 135–450)
PMV BLD AUTO: 7 FL (ref 5–10.5)
POTASSIUM SERPL-SCNC: 4.4 MMOL/L (ref 3.5–5.1)
PROT SERPL-MCNC: 6.4 G/DL (ref 6.4–8.2)
PROT UR STRIP.AUTO-MCNC: NEGATIVE MG/DL
RBC # BLD AUTO: 3.02 M/UL (ref 4–5.2)
RBC CLUMPS #/AREA URNS AUTO: 1 /HPF (ref 0–4)
SODIUM SERPL-SCNC: 126 MMOL/L (ref 136–145)
SP GR UR STRIP.AUTO: 1.01 (ref 1–1.03)
TROPONIN, HIGH SENSITIVITY: 23 NG/L (ref 0–14)
TROPONIN, HIGH SENSITIVITY: 27 NG/L (ref 0–14)
TROPONIN, HIGH SENSITIVITY: 28 NG/L (ref 0–14)
UA COMPLETE W REFLEX CULTURE PNL UR: YES
UA DIPSTICK W REFLEX MICRO PNL UR: YES
URN SPEC COLLECT METH UR: ABNORMAL
UROBILINOGEN UR STRIP-ACNC: 0.2 E.U./DL
WBC # BLD AUTO: 7.5 K/UL (ref 4–11)
WBC #/AREA URNS AUTO: 36 /HPF (ref 0–5)

## 2025-04-02 PROCEDURE — 87086 URINE CULTURE/COLONY COUNT: CPT

## 2025-04-02 PROCEDURE — 84484 ASSAY OF TROPONIN QUANT: CPT

## 2025-04-02 PROCEDURE — 6360000004 HC RX CONTRAST MEDICATION: Performed by: PHYSICIAN ASSISTANT

## 2025-04-02 PROCEDURE — 93005 ELECTROCARDIOGRAM TRACING: CPT | Performed by: PHYSICIAN ASSISTANT

## 2025-04-02 PROCEDURE — 83735 ASSAY OF MAGNESIUM: CPT

## 2025-04-02 PROCEDURE — 81001 URINALYSIS AUTO W/SCOPE: CPT

## 2025-04-02 PROCEDURE — 83880 ASSAY OF NATRIURETIC PEPTIDE: CPT

## 2025-04-02 PROCEDURE — 6370000000 HC RX 637 (ALT 250 FOR IP): Performed by: PHYSICIAN ASSISTANT

## 2025-04-02 PROCEDURE — 6370000000 HC RX 637 (ALT 250 FOR IP): Performed by: NURSE PRACTITIONER

## 2025-04-02 PROCEDURE — 93010 ELECTROCARDIOGRAM REPORT: CPT | Performed by: INTERNAL MEDICINE

## 2025-04-02 PROCEDURE — 6360000002 HC RX W HCPCS: Performed by: PHYSICIAN ASSISTANT

## 2025-04-02 PROCEDURE — 85025 COMPLETE CBC W/AUTO DIFF WBC: CPT

## 2025-04-02 PROCEDURE — 99285 EMERGENCY DEPT VISIT HI MDM: CPT

## 2025-04-02 PROCEDURE — 96361 HYDRATE IV INFUSION ADD-ON: CPT

## 2025-04-02 PROCEDURE — G0378 HOSPITAL OBSERVATION PER HR: HCPCS

## 2025-04-02 PROCEDURE — 2580000003 HC RX 258: Performed by: PHYSICIAN ASSISTANT

## 2025-04-02 PROCEDURE — 96365 THER/PROPH/DIAG IV INF INIT: CPT

## 2025-04-02 PROCEDURE — 71045 X-RAY EXAM CHEST 1 VIEW: CPT

## 2025-04-02 PROCEDURE — 80053 COMPREHEN METABOLIC PANEL: CPT

## 2025-04-02 PROCEDURE — 71260 CT THORAX DX C+: CPT

## 2025-04-02 RX ORDER — POTASSIUM CHLORIDE 7.45 MG/ML
10 INJECTION INTRAVENOUS PRN
Status: DISCONTINUED | OUTPATIENT
Start: 2025-04-02 | End: 2025-04-05 | Stop reason: HOSPADM

## 2025-04-02 RX ORDER — CITALOPRAM HYDROBROMIDE 20 MG/1
20 TABLET ORAL DAILY
Status: DISCONTINUED | OUTPATIENT
Start: 2025-04-03 | End: 2025-04-05 | Stop reason: HOSPADM

## 2025-04-02 RX ORDER — ACETAMINOPHEN 650 MG/1
650 SUPPOSITORY RECTAL EVERY 6 HOURS PRN
Status: DISCONTINUED | OUTPATIENT
Start: 2025-04-02 | End: 2025-04-05 | Stop reason: HOSPADM

## 2025-04-02 RX ORDER — ATORVASTATIN CALCIUM 80 MG/1
80 TABLET, FILM COATED ORAL DAILY
Status: DISCONTINUED | OUTPATIENT
Start: 2025-04-03 | End: 2025-04-05 | Stop reason: HOSPADM

## 2025-04-02 RX ORDER — 0.9 % SODIUM CHLORIDE 0.9 %
500 INTRAVENOUS SOLUTION INTRAVENOUS ONCE
Status: COMPLETED | OUTPATIENT
Start: 2025-04-02 | End: 2025-04-02

## 2025-04-02 RX ORDER — IOPAMIDOL 755 MG/ML
75 INJECTION, SOLUTION INTRAVASCULAR
Status: COMPLETED | OUTPATIENT
Start: 2025-04-02 | End: 2025-04-02

## 2025-04-02 RX ORDER — ONDANSETRON 2 MG/ML
4 INJECTION INTRAMUSCULAR; INTRAVENOUS EVERY 6 HOURS PRN
Status: DISCONTINUED | OUTPATIENT
Start: 2025-04-02 | End: 2025-04-05 | Stop reason: HOSPADM

## 2025-04-02 RX ORDER — SODIUM CHLORIDE 0.9 % (FLUSH) 0.9 %
5-40 SYRINGE (ML) INJECTION PRN
Status: DISCONTINUED | OUTPATIENT
Start: 2025-04-02 | End: 2025-04-04

## 2025-04-02 RX ORDER — LOSARTAN POTASSIUM 100 MG/1
100 TABLET ORAL DAILY
Status: DISCONTINUED | OUTPATIENT
Start: 2025-04-03 | End: 2025-04-03

## 2025-04-02 RX ORDER — ACETAMINOPHEN 325 MG/1
650 TABLET ORAL EVERY 6 HOURS PRN
Status: DISCONTINUED | OUTPATIENT
Start: 2025-04-02 | End: 2025-04-05 | Stop reason: HOSPADM

## 2025-04-02 RX ORDER — SENNOSIDES A AND B 8.6 MG/1
1 TABLET, FILM COATED ORAL DAILY PRN
Status: DISCONTINUED | OUTPATIENT
Start: 2025-04-02 | End: 2025-04-05 | Stop reason: HOSPADM

## 2025-04-02 RX ORDER — SODIUM CHLORIDE 0.9 % (FLUSH) 0.9 %
5-40 SYRINGE (ML) INJECTION EVERY 12 HOURS SCHEDULED
Status: DISCONTINUED | OUTPATIENT
Start: 2025-04-02 | End: 2025-04-05 | Stop reason: HOSPADM

## 2025-04-02 RX ORDER — LEVOTHYROXINE SODIUM 25 UG/1
50 TABLET ORAL DAILY
Status: DISCONTINUED | OUTPATIENT
Start: 2025-04-03 | End: 2025-04-05 | Stop reason: HOSPADM

## 2025-04-02 RX ORDER — MAGNESIUM SULFATE 1 G/100ML
1000 INJECTION INTRAVENOUS ONCE
Status: COMPLETED | OUTPATIENT
Start: 2025-04-02 | End: 2025-04-02

## 2025-04-02 RX ORDER — POTASSIUM CHLORIDE 1500 MG/1
40 TABLET, EXTENDED RELEASE ORAL PRN
Status: DISCONTINUED | OUTPATIENT
Start: 2025-04-02 | End: 2025-04-05 | Stop reason: HOSPADM

## 2025-04-02 RX ORDER — MAGNESIUM SULFATE IN WATER 40 MG/ML
2000 INJECTION, SOLUTION INTRAVENOUS PRN
Status: DISCONTINUED | OUTPATIENT
Start: 2025-04-02 | End: 2025-04-05 | Stop reason: HOSPADM

## 2025-04-02 RX ORDER — SODIUM CHLORIDE 9 MG/ML
INJECTION, SOLUTION INTRAVENOUS PRN
Status: DISCONTINUED | OUTPATIENT
Start: 2025-04-02 | End: 2025-04-05 | Stop reason: HOSPADM

## 2025-04-02 RX ADMIN — IOPAMIDOL 75 ML: 755 INJECTION, SOLUTION INTRAVENOUS at 17:28

## 2025-04-02 RX ADMIN — MELATONIN TAB 3 MG 3 MG: 3 TAB at 22:59

## 2025-04-02 RX ADMIN — SODIUM CHLORIDE 500 ML: 9 INJECTION, SOLUTION INTRAVENOUS at 16:53

## 2025-04-02 RX ADMIN — APIXABAN 5 MG: 5 TABLET, FILM COATED ORAL at 22:59

## 2025-04-02 RX ADMIN — MAGNESIUM SULFATE HEPTAHYDRATE 1000 MG: 1 INJECTION, SOLUTION INTRAVENOUS at 17:51

## 2025-04-02 RX ADMIN — ACETAMINOPHEN 650 MG: 325 TABLET ORAL at 23:48

## 2025-04-02 ASSESSMENT — LIFESTYLE VARIABLES
HOW MANY STANDARD DRINKS CONTAINING ALCOHOL DO YOU HAVE ON A TYPICAL DAY: 1 OR 2
HOW OFTEN DO YOU HAVE A DRINK CONTAINING ALCOHOL: 4 OR MORE TIMES A WEEK

## 2025-04-02 NOTE — ED PROVIDER NOTES
In addition to the advanced practice provider, I personally saw Fatimah Hernández and performed a substantive portion of the visit including all aspects of the medical decision making.    Medical Decision Making  Patient seen and evaluated at bedside.  Briefly, she is presenting with dyspnea on exertion and generalized fatigue/weakness.  Lab workup negative leukocytosis.  There is a mild anemia of 9.9 which is above her baseline.  There is a hyponatremia of 126 and does appear hypovolemic on exam.  Initially, her troponin was elevated at 23 with repeat at 27; additional repeat plateaued at 28.    CT imaging negative for acute abnormalities.  I suspect that most of her symptoms are likely secondary to her hyponatremia which I suspect is secondary to dehydration.  She was given gentle IV fluid rehydration in the ED and will admit for continued treatment of her hyponatremia to ensure slow correction.    EKG  Sinus rhythm with ventricular rate of 115 bpm.  Right bundle branch block which appears to be new compared to prior ECGs.  Normal axis.  QRS duration of 128.  QTc of 498.    SEP-1  Is this patient to be included in the SEP-1 Core Measure due to severe sepsis or septic shock?   No     Exclusion criteria - the patient is NOT to be included for SEP-1 Core Measure due to:  2+ SIRS criteria are not met    Screenings     [place] Coma Scale  Eye Opening: Spontaneous  Best Verbal Response: Oriented  Best Motor Response: Obeys commands  Carolyn Coma Scale Score: 15             Patient Referrals:  No follow-up provider specified.    Discharge Medications:  Current Discharge Medication List          FINAL IMPRESSION  1. Dyspnea on exertion    2. Right bundle branch block    3. Hyponatremia    4. Hypomagnesemia    5. Pyuria    6. Paraspinal mass        Blood pressure 108/69, pulse 86, temperature 98 °F (36.7 °C), temperature source Oral, resp. rate 20, SpO2 100%.     I personally saw the patient and made/approved the  management plan and take responsibility for the patient management.    For further details of Fatimah Hernández's emergency department encounter, please see documentation by advanced practice provider, Bradley Crespo.        Gómez Marina DO  04/02/25 [id number]

## 2025-04-02 NOTE — TELEPHONE ENCOUNTER
Yvonne w/ [facility] called stating the pt HR rate is 114, pt has SOB when moving around, pt is fatigue and is not sleeping well. Current wt is 173     Pt has taken all of her medications as prescribed.      Pls advise   Yvonne   [phone]

## 2025-04-02 NOTE — H&P
[facility] Medicine History & Physical        Name:  Fatimah Hernández /Age/Sex: 10/25/1934  (89 y.o. female)   MRN & CSN:  [id number] & [id number] Encounter Date/Time: 2025 7:48 PM EDT   Location:  ED- PCP: Alexander Day MD         CHIEF COMPLAINT:   Chief Complaint   Patient presents with    Abnormal Lab     Pt states that she had a knew replacement and had labs drawn on 3/27 by PCP. Pt states that cardiologist saw the results and told her to come to ER due to weakness, SOB, and lightheadedness. Pt states WBC were high and Hgb was low.          HISTORY OF PRESENT ILLNESS:      History from: patient  Limitations to history : None     Fatimah Hernández is a 89 y.o. female who presented to ED for evaluation of tachycardia.  Patient had a left knee replacement 3 weeks ago and reports her pain has been improving and she has been doing well.  She reports she has noticed some lightheadedness, fatigue and some shortness of breath with exertion and when nursing staff came to her house today they noted her heart rate was elevated and recommended she be evaluated in the ED.  Patient had labs drawn last week that revealed some leukocytosis and anemia.  Patient denies chest pain, shortness of breath at rest, cough, edema, GI symptoms, urinary symptoms.  She denies any other complaints or concerns at this time.      REVIEW OF SYSTEMS:   Pertinent positives as noted in the HPI. All other systems reviewed and negative.      PHYSICAL EXAM PERFORMED:  /69   Pulse 86   Temp 98 °F (36.7 °C) (Oral)   Resp 20   SpO2 100%     General appearance:  Awake, alert, no apparent distress  HEENT:  Normocephalic, atraumatic without obvious deformity. PERRL. EOM intact. Conjunctivae/corneas clear.  Neck:  Supple, with full range of motion. No JVD. Trachea midline.  Respiratory:  Clear to auscultation bilaterally without rales, wheezes, or rhonchi. Normal respiratory effort.   Cardiovascular:  Regular rate and  \"BC\"  No results found for: \"BLOODCULT2\"  Organism: No results found for: \"ORG\"    IMAGING/DIAGNOSTICS LAST 24 HOURS    CT CHEST PULMONARY EMBOLISM W CONTRAST  Result Date: 4/2/2025  EXAMINATION: CTA OF THE CHEST 4/2/2025 2:20 pm TECHNIQUE: CTA of the chest was performed after the administration of intravenous contrast.  Multiplanar reformatted images are provided for review.  MIP images are provided for review. Automated exposure control, iterative reconstruction, and/or weight based adjustment of the mA/kV was utilized to reduce the radiation dose to as low as reasonably achievable. COMPARISON: Correlated with plain radiographs of 03/03/2023 HISTORY: ORDERING SYSTEM PROVIDED HISTORY: Tachycardia, shortness of breath on exertion, recent total knee replacement TECHNOLOGIST PROVIDED HISTORY: Reason for exam:->Tachycardia, shortness of breath on exertion, recent total knee replacement Additional Contrast?->1 Reason for Exam: Tachycardia, shortness of breath on exertion, recent total knee replacement FINDINGS: Pulmonary Arteries: Pulmonary arteries are adequately opacified for evaluation. No evidence of intraluminal filling defect to suggest pulmonary embolism.  Main pulmonary artery is normal in caliber. Mediastinum: There are a few small nonspecific lymph nodes seen in the middle mediastinum. No suspicious lymphadenopathy. Lungs/pleura: Mild right apical fibrosis..  There is mild bibasilar atelectasis/fibrosis. No pulmonary masses are noted. No pleural effusions are identified. Cardiomediastinal Structures: Coronary arterial calcifications are seen. Intimal calcifications associated with the thoracic aorta. No evidence of thoracic aortic aneurysm or dissection . Cardiac chambers are mildly enlarged Upper Abdomen: Cholelithiasis Soft Tissues/Bones: There are hypertrophic degenerative changes in the thoracic spine.  Stable compression fractures in the mid and lower thoracic spine.  No acute osseous abnormalities.  2 cm

## 2025-04-02 NOTE — ED PROVIDER NOTES
[facility] EMERGENCY DEPARTMENT  EMERGENCY DEPARTMENT ENCOUNTER        Pt Name: Fatimah Hernández  MRN: [id number]  Birthdate 10/25/1934  Date of evaluation: 4/2/2025  Provider: Bradley Crespo PA-C  PCP: Alexander Day MD  Note Started: 3:36 PM EDT 4/2/25       I have seen and evaluated this patient with my supervising physician MAXINE CAGLE .      CHIEF COMPLAINT       Chief Complaint   Patient presents with    Abnormal Lab     Pt states that she had a knew replacement and had labs drawn on 3/27 by PCP. Pt states that cardiologist saw the results and told her to come to ER due to weakness, SOB, and lightheadedness. Pt states WBC were high and Hgb was low.        HISTORY OF PRESENT ILLNESS: 1 or more Elements     History From: patient  Limitations to history : None    Fatimah Hernández is a 89 y.o. female who presents to the emergency department after her nurse came to her house today and realized that she was tachycardic.  Patient had left knee replacement by Dr. Saini exactly 3 weeks ago.  She states that her pain has been improving her left leg and she has been doing well.  She states that she has noticed lightheadedness, fatigue and some shortness of breath on exertion and when nursing staff came to her house today her heart rate was elevated in the 1 her to be evaluated as she recently had labs drawn last week that revealed some leukocytosis and anemia.  She denies chest pain, shortness of breath at rest, nausea, vomiting, fevers, cough, dysuria, hematuria, diarrhea, bloody stool or black tarry stools.  Denies any other symptoms.    Nursing Notes were all reviewed and agreed with or any disagreements were addressed in the HPI.    REVIEW OF SYSTEMS :      Review of Systems    Positives and Pertinent negatives as per HPI.     SURGICAL HISTORY     Past Surgical History:   Procedure Laterality Date    BONY PELVIS SURGERY      repair fx pelvis    COLONOSCOPY N/A 01/22/2024    COLONOSCOPY  WITH BIOPSY performed by Durga Mendes MD at [facility] END[facility]COPY    HYSTERECTOMY (CERVIX STATUS UNKNOWN)          HYSTERECTOMY, TOTAL ABDOMINAL (CERVIX REMOVED)      PARTIAL HIP ARTHROPLASTY Left        CURRENTMEDICATIONS       Previous Medications    ACETAMINOPHEN (TYLENOL) 325 MG TABLET    Take 2 tablets by mouth every 6 hours as needed for Pain    ATORVASTATIN (LIPITOR) 80 MG TABLET    Take 1 tablet by mouth daily    CITALOPRAM (CELEXA) 20 MG TABLET    Take 1 tablet by mouth daily    ELIQUIS 5 MG TABS TABLET    TAKE ONE TABLET BY MOUTH TWICE DAILY    LEVOTHYROXINE (SYNTHROID) 50 MCG TABLET    Take 1 tablet by mouth Daily    LOSARTAN (COZAAR) 100 MG TABLET    TAKE ONE TABLET BY MOUTH EVERY DAY    METHOCARBAMOL (ROBAXIN) 750 MG TABLET    Take 1 tablet by mouth 3 times daily as needed    MULTIPLE VITAMIN (MULTIVITAMIN ADULT PO)    Take 1 tablet by mouth daily    MULTIPLE VITAMINS-MINERALS (ICAPS AREDS 2 PO)    Take by mouth    SENNA (SENOKOT) 8.6 MG TABLET    Take 1 tablet by mouth as needed for Constipation    VITAMIN D (ERGOCALCIFEROL) 1.25 MG (55476 UT) CAPS CAPSULE    TAKE ONE CAPSULE BY MOUTH Every week on        ALLERGIES     Patient has no known allergies.    FAMILYHISTORY     History reviewed. No pertinent family history.     SOCIAL HISTORY       Social History     Tobacco Use    Smoking status: Former     Current packs/day: 0.00     Types: Cigarettes     Quit date:      Years since quittin.2    Smokeless tobacco: Never   Vaping Use    Vaping status: Never Used   Substance Use Topics    Alcohol use: Yes     Comment: social    Drug use: Never       SCREENINGS     NIHSS:     GCS: Carolyn Coma Scale  Eye Opening: Spontaneous  Best Verbal Response: Oriented  Best Motor Response: Obeys commands  Carolyn Coma Scale Score: 15    Heart Score      PECARN Last:       CIWA: CIWA Assessment  BP: 108/69  Pulse: 86  COW Score: No data recorded   CURB 65 Last:     PORT Score:     WELL Criteria:

## 2025-04-02 NOTE — TELEPHONE ENCOUNTER
I spoke to the [facility] and she has home care due to recent knee surgery. Her B/P was 99/77 and her HR on a second check was 118. She says her B/P has been on the lower side.     Per Dr. Razo she should go the the ER. I spoke to the [facility] and also spoke with Fatimah. She says she will go to [facility].

## 2025-04-03 ENCOUNTER — APPOINTMENT (OUTPATIENT)
Dept: MRI IMAGING | Age: 89
DRG: 641 | End: 2025-04-03
Payer: MEDICARE

## 2025-04-03 ENCOUNTER — APPOINTMENT (OUTPATIENT)
Age: 89
DRG: 641 | End: 2025-04-03
Attending: STUDENT IN AN ORGANIZED HEALTH CARE EDUCATION/TRAINING PROGRAM
Payer: MEDICARE

## 2025-04-03 LAB
ALBUMIN SERPL-MCNC: 3.3 G/DL (ref 3.4–5)
ANION GAP SERPL CALCULATED.3IONS-SCNC: 8 MMOL/L (ref 3–16)
BACTERIA UR CULT: NORMAL
BASOPHILS # BLD: 0 K/UL (ref 0–0.2)
BASOPHILS NFR BLD: 0.5 %
BUN SERPL-MCNC: 10 MG/DL (ref 7–20)
CALCIUM SERPL-MCNC: 8.8 MG/DL (ref 8.3–10.6)
CHLORIDE SERPL-SCNC: 96 MMOL/L (ref 99–110)
CO2 SERPL-SCNC: 24 MMOL/L (ref 21–32)
CORTIS SERPL-MCNC: 5.8 UG/DL
CREAT SERPL-MCNC: 0.9 MG/DL (ref 0.6–1.2)
CREAT UR-MCNC: 70.5 MG/DL (ref 28–259)
DEPRECATED RDW RBC AUTO: 14.5 % (ref 12.4–15.4)
EOSINOPHIL # BLD: 0.3 K/UL (ref 0–0.6)
EOSINOPHIL NFR BLD: 4.3 %
GFR SERPLBLD CREATININE-BSD FMLA CKD-EPI: 61 ML/MIN/{1.73_M2}
GLUCOSE SERPL-MCNC: 103 MG/DL (ref 70–99)
HCT VFR BLD AUTO: 27.6 % (ref 36–48)
HGB BLD-MCNC: 9.4 G/DL (ref 12–16)
LEFT VENTRICULAR EJECTION FRACTION MODE: NORMAL
LV EF: 65 % (ref 65–70)
LYMPHOCYTES # BLD: 1.6 K/UL (ref 1–5.1)
LYMPHOCYTES NFR BLD: 22.2 %
MCH RBC QN AUTO: 33.1 PG (ref 26–34)
MCHC RBC AUTO-ENTMCNC: 34.2 G/DL (ref 31–36)
MCV RBC AUTO: 97 FL (ref 80–100)
MONOCYTES # BLD: 0.8 K/UL (ref 0–1.3)
MONOCYTES NFR BLD: 10.5 %
NEUTROPHILS # BLD: 4.6 K/UL (ref 1.7–7.7)
NEUTROPHILS NFR BLD: 62.5 %
OSMOLALITY SERPL: 273 MOSM/KG (ref 280–301)
OSMOLALITY UR: 333 MOSM/KG (ref 390–1070)
PHOSPHATE SERPL-MCNC: 3.9 MG/DL (ref 2.5–4.9)
PLATELET # BLD AUTO: 397 K/UL (ref 135–450)
PMV BLD AUTO: 6.7 FL (ref 5–10.5)
POTASSIUM SERPL-SCNC: 4.9 MMOL/L (ref 3.5–5.1)
RBC # BLD AUTO: 2.84 M/UL (ref 4–5.2)
SODIUM SERPL-SCNC: 128 MMOL/L (ref 136–145)
SODIUM UR-SCNC: 54 MMOL/L
TSH SERPL DL<=0.005 MIU/L-ACNC: 6.06 UIU/ML (ref 0.27–4.2)
URATE SERPL-MCNC: 4.9 MG/DL (ref 2.6–6)
WBC # BLD AUTO: 7.4 K/UL (ref 4–11)

## 2025-04-03 PROCEDURE — 82570 ASSAY OF URINE CREATININE: CPT

## 2025-04-03 PROCEDURE — 36415 COLL VENOUS BLD VENIPUNCTURE: CPT

## 2025-04-03 PROCEDURE — 80069 RENAL FUNCTION PANEL: CPT

## 2025-04-03 PROCEDURE — A9577 INJ MULTIHANCE: HCPCS | Performed by: PHYSICIAN ASSISTANT

## 2025-04-03 PROCEDURE — G0378 HOSPITAL OBSERVATION PER HR: HCPCS

## 2025-04-03 PROCEDURE — 6360000004 HC RX CONTRAST MEDICATION: Performed by: PHYSICIAN ASSISTANT

## 2025-04-03 PROCEDURE — 2500000003 HC RX 250 WO HCPCS: Performed by: PHYSICIAN ASSISTANT

## 2025-04-03 PROCEDURE — 84443 ASSAY THYROID STIM HORMONE: CPT

## 2025-04-03 PROCEDURE — 6370000000 HC RX 637 (ALT 250 FOR IP): Performed by: PHYSICIAN ASSISTANT

## 2025-04-03 PROCEDURE — 85025 COMPLETE CBC W/AUTO DIFF WBC: CPT

## 2025-04-03 PROCEDURE — 84439 ASSAY OF FREE THYROXINE: CPT

## 2025-04-03 PROCEDURE — 93306 TTE W/DOPPLER COMPLETE: CPT

## 2025-04-03 PROCEDURE — 82533 TOTAL CORTISOL: CPT

## 2025-04-03 PROCEDURE — 83935 ASSAY OF URINE OSMOLALITY: CPT

## 2025-04-03 PROCEDURE — 99223 1ST HOSP IP/OBS HIGH 75: CPT | Performed by: INTERNAL MEDICINE

## 2025-04-03 PROCEDURE — 6370000000 HC RX 637 (ALT 250 FOR IP): Performed by: INTERNAL MEDICINE

## 2025-04-03 PROCEDURE — 83930 ASSAY OF BLOOD OSMOLALITY: CPT

## 2025-04-03 PROCEDURE — 6370000000 HC RX 637 (ALT 250 FOR IP): Performed by: NURSE PRACTITIONER

## 2025-04-03 PROCEDURE — 72157 MRI CHEST SPINE W/O & W/DYE: CPT

## 2025-04-03 PROCEDURE — 84550 ASSAY OF BLOOD/URIC ACID: CPT

## 2025-04-03 PROCEDURE — 84300 ASSAY OF URINE SODIUM: CPT

## 2025-04-03 RX ORDER — METOPROLOL SUCCINATE 25 MG/1
25 TABLET, EXTENDED RELEASE ORAL
Status: DISCONTINUED | OUTPATIENT
Start: 2025-04-03 | End: 2025-04-05 | Stop reason: HOSPADM

## 2025-04-03 RX ORDER — LOSARTAN POTASSIUM 25 MG/1
50 TABLET ORAL DAILY
Status: DISCONTINUED | OUTPATIENT
Start: 2025-04-04 | End: 2025-04-05 | Stop reason: HOSPADM

## 2025-04-03 RX ORDER — POLYETHYLENE GLYCOL 3350 17 G/17G
17 POWDER, FOR SOLUTION ORAL ONCE
Status: COMPLETED | OUTPATIENT
Start: 2025-04-03 | End: 2025-04-03

## 2025-04-03 RX ADMIN — LEVOTHYROXINE SODIUM 50 MCG: 0.03 TABLET ORAL at 06:25

## 2025-04-03 RX ADMIN — MELATONIN TAB 3 MG 3 MG: 3 TAB at 20:42

## 2025-04-03 RX ADMIN — LOSARTAN POTASSIUM 100 MG: 100 TABLET, FILM COATED ORAL at 08:46

## 2025-04-03 RX ADMIN — ATORVASTATIN CALCIUM 80 MG: 80 TABLET, FILM COATED ORAL at 08:46

## 2025-04-03 RX ADMIN — APIXABAN 5 MG: 5 TABLET, FILM COATED ORAL at 20:41

## 2025-04-03 RX ADMIN — METOPROLOL SUCCINATE 25 MG: 25 TABLET, EXTENDED RELEASE ORAL at 20:41

## 2025-04-03 RX ADMIN — CITALOPRAM 20 MG: 20 TABLET, FILM COATED ORAL at 08:46

## 2025-04-03 RX ADMIN — ACETAMINOPHEN 650 MG: 325 TABLET ORAL at 22:02

## 2025-04-03 RX ADMIN — Medication 10 ML: at 20:42

## 2025-04-03 RX ADMIN — POLYETHYLENE GLYCOL 3350 17 G: 17 POWDER, FOR SOLUTION ORAL at 20:41

## 2025-04-03 RX ADMIN — GADOBENATE DIMEGLUMINE 20 ML: 529 INJECTION, SOLUTION INTRAVENOUS at 14:00

## 2025-04-03 RX ADMIN — ACETAMINOPHEN 650 MG: 325 TABLET ORAL at 16:28

## 2025-04-03 RX ADMIN — Medication 10 ML: at 08:47

## 2025-04-03 ASSESSMENT — PAIN DESCRIPTION - LOCATION: LOCATION: KNEE

## 2025-04-03 ASSESSMENT — PAIN SCALES - GENERAL
PAINLEVEL_OUTOF10: 4
PAINLEVEL_OUTOF10: 3
PAINLEVEL_OUTOF10: 0

## 2025-04-03 ASSESSMENT — PAIN DESCRIPTION - DESCRIPTORS: DESCRIPTORS: ACHING

## 2025-04-03 ASSESSMENT — PAIN DESCRIPTION - ORIENTATION: ORIENTATION: LEFT

## 2025-04-03 NOTE — CARE COORDINATION
Discharge Planning Assessment:   Patient from home , currently in  Observation with an anticipated short hospitalization length of stay. Chart reviewed and it appears that patient has - None needs at this time.    PCP;  Alexander Day MD    Insurance;  [employer] Medicare      Discussed with patient’s nurse and requested that case management be notified when /if additional discharge needs are identified.     Case management will continue to follow progress and update discharge plan as needed.

## 2025-04-03 NOTE — ACP (ADVANCE CARE PLANNING)
Advanced Care Planning Note    Purpose of Encounter: Advanced care planning in light of dyspnea  Parties In Attendance: Patient, Andrew Bullard MD  Decisional Capacity: Yes  Subjective: Patient had chest pain  Objective: Cr 0.8  Goals of Care Determination: Patient wants comfort care measures in case of any arrest  Plan:  MRI, Cardio eval  Code Status: DNR-CC   Time spent on Advanced care Plannin minutes  Advanced Care Planning Documents: Completed advanced directives on chart, Farida, is the Healthcare POA.    Andrew Bullard MD  4/3/2025 12:49 PM

## 2025-04-03 NOTE — CONSULTS
Brief Consult Note:  Full consult note to follow.    Thank you for consulting [facility].    We will follow along this admission.  Please do not hesitate to reach out via perfect serve or our main line at [phone] with any questions or concerns.    Mohan Love PA-C

## 2025-04-03 NOTE — PROGRESS NOTES
Pt awake in bed watching tv. VSS and pt denies any needs. Call light in reach and bed alarm engaged.

## 2025-04-03 NOTE — PLAN OF CARE
Problem: Discharge Planning  Goal: Discharge to home or other facility with appropriate resources  Outcome: Progressing     Problem: Safety - Adult  Goal: Free from fall injury  Outcome: Progressing     Problem: ABCDS Injury Assessment  Goal: Absence of physical injury  Outcome: Progressing     Problem: Cardiovascular - Adult  Goal: Maintains optimal cardiac output and hemodynamic stability  Outcome: Progressing     Problem: Skin/Tissue Integrity - Adult  Goal: Skin integrity remains intact  Outcome: Progressing  Goal: Incisions, wounds, or drain sites healing without S/S of infection  Outcome: Progressing     Problem: Musculoskeletal - Adult  Goal: Return mobility to safest level of function  Outcome: Progressing  Goal: Maintain proper alignment of affected body part  Outcome: Progressing     Problem: Genitourinary - Adult  Goal: Absence of urinary retention  Outcome: Progressing

## 2025-04-03 NOTE — PROGRESS NOTES
Admit Date: 4/2/2025  Diet: Diet NPO Exceptions are: Sips of Water with Meds    CC: Dyspnea    Interval history:   Overnight, there were no acute events. Patient's vitals remained stable    Patient was seen this morning. I discussed the plan of the day with her in detail. All questions were addressed and answered to patient's satisfaction.   Patient denies fevers, chills, nausea, vomiting, chest pain, shortness of breath, diarrhea, constipation, dysuria, urinary frequency or urgency.     Plan:     -MRI T Spine  -Echo pending   -Will await any further recs from cardiology    Assessment:   Fatimah Hernández is a 89 y.o. female with PMH of p. Afib on Eliquis, Paraspinal mass, Hypothyroidism who was admitted with dyspnea    Dyspnea on exertion.  Elevated troponin.  Coronary calcification on CT  - Patient denies chest pain  - Troponin 12 ?  28  - EKG shows sinus tachycardia rate 115, RBBB which appears to be new compared to prior EKGs  - Monitor on telemetry  - Cardiology consulted     Acute on chronic hyponatremia  - Na 126, baseline ~130-133  - 500 ml bolus IV NS administered in ED  - Check Urine Sodium, Urine Creatinine, Urine and serum osmolarity, TSH, cortisol and uric acid   - Monitor Na  - Nephrology consulted     Paraspinal mass  - Incidental 2 cm right paraspinal mass noted at the T10 level.  - MRI ordered     Hypothyroidism  - Continue home Synthroid  - Check TSH     Paroxysmal A-fib  - Continue home Eliquis  - Not currently on any rate control or antiarrhythmic medication    Code Status: Full Code   FEN: Diet NPO Exceptions are: Sips of Water with Meds   DVT PPX: []Lovenox, []Heparin, []Coumadin, []Eliquis, []Xarelto, []SCD  DISPO Pending: MRI    Andrew Bullard MD  4/3/2025,  12:31 PM    This note was likely completed using voice recognition technology and may contain unintended errors.     Objective:   Vitals:   T-max:  Patient Vitals for the past 8 hrs:   BP Temp Temp src Pulse Resp SpO2 Weight  results for input(s): \"CHOL\", \"HDL\" in the last 72 hours.    Invalid input(s): \"LDLCALCU\", \"TRIGLYCERIDE\"  ABGs:  No results for input(s): \"PHART\", \"EPD4TEW\", \"PO2ART\", \"WAI8VAW\", \"BEART\", \"THGBART\", \"M3UULUAX\", \"YBN3IHE\" in the last 72 hours.    INR: No results for input(s): \"INR\" in the last 72 hours.  Lactate: No results for input(s): \"LACTATE\" in the last 72 hours.  Cultures:  -----------------------------------------------------------------  RAD:   CT CHEST PULMONARY EMBOLISM W CONTRAST   Final Result   No acute intrathoracic abnormalities are noted.      Coronary artery/atherosclerotic disease      No evidence of pulmonary embolism      2 cm right paraspinal mass at the T10 level.  Consider follow-up nonemergent   outpatient MRI for further evaluation         XR CHEST PORTABLE   Final Result   No acute cardiopulmonary process. Mild cardiomegaly.         MRI THORACIC SPINE W WO CONTRAST    (Results Pending)       Medications:     Scheduled Meds:   atorvastatin  80 mg Oral Daily    citalopram  20 mg Oral Daily    apixaban  5 mg Oral BID    levothyroxine  50 mcg Oral Daily    losartan  100 mg Oral Daily    sodium chloride flush  5-40 mL IntraVENous 2 times per day     Continuous Infusions:   sodium chloride       PRN Meds:sodium chloride flush, sodium chloride, potassium chloride **OR** potassium alternative oral replacement **OR** potassium chloride, magnesium sulfate, ondansetron, senna, acetaminophen **OR** acetaminophen, melatonin

## 2025-04-03 NOTE — ED NOTES
Patient Name: Fatimah Hernández  : 10/25/1934 89 y.o.  MRN: [id number]  ED Room #: ED-0014/14     Chief complaint:   Chief Complaint   Patient presents with    Abnormal Lab     Pt states that she had a knew replacement and had labs drawn on 3/27 by PCP. Pt states that cardiologist saw the results and told her to come to ER due to weakness, SOB, and lightheadedness. Pt states WBC were high and Hgb was low.      Hospital Problem/Diagnosis:   Hospital Problems           Last Modified POA    * (Principal) Dyspnea on exertion 2025 Yes         O2 Flow Rate:    (if applicable)  Cardiac Rhythm:   (if applicable)  Active LDA's:   Peripheral IV 25 Left;Proximal;Anterior Forearm (Active)            How does patient ambulate? Unknown, did not assess in the Emergency Department    2. How does patient take pills? Unknown, no oral medications were given in the Emergency Department    3. Is patient alert? Alert    4. Is patient oriented? To Person, To Place, To Time, and To Situation    5.   Patient arrived from:    Facility Name: ___________________________________________    6. If patient is disoriented or from a Skill Nursing Facility has family been notified of admission?     7. Patient belongings?     Disposition of belongings? Kept with Patient     8. Any specific patient or family belongings/needs/dynamics?   a.     9. Miscellaneous comments/pending orders?  a.       If there are any additional questions please reach out to the Emergency Department.

## 2025-04-03 NOTE — CONSULTS
[facility]      GENERAL CARDIOLOGY CONSULTATION  [phone]        Inpatient consult to Cardiology  Consult performed by: Charity Jones DO  Consult ordered by: Mariela Lopez PA-C  Reason for consult: CHRISTIE          Chief Complaint   Patient presents with    Abnormal Lab     Pt states that she had a knew replacement and had labs drawn on 3/27 by PCP. Pt states that cardiologist saw the results and told her to come to ER due to weakness, SOB, and lightheadedness. Pt states WBC were high and Hgb was low.         ASSESSMENT/PLAN:  Tachycardia  PAF on OAC  Elevated troponin  CAC on CT  Hyponatremia  HTN    Presenting EKG demonstrates a regular, narrow complex tachycardia.  Given patient's history of paroxysmal atrial fibrillation, atrial flutter is a consideration.  PAF seen on inpatient telemetry yesterday, currently sinus rhythm.  Suspect etiology related to electrolyte disturbance and recent surgery stress response.  Currently, patient is in normal sinus rhythm.  TSH WNL.  We discussed utilizing AV avinash blocking therapy.  Given her reported tendency towards low blood pressure, recommend reducing losartan to 50 mg daily and introducing Toprol XL 25 mg nightly.  Patient verbalized understanding and agreement with the plan.  She is anticoagulated on Eliquis 5 mg twice daily, appropriate dosing for age, weight and renal function.    Elevated troponin is mild, adynamic not indicative of ACS.  ECG does not demonstrate any acute ischemic findings.  Patient denies anginal symptoms.  Incidentally, CAC seen on CT.  Findings discussed with patient.  Recent stress test December 31, 2024 was negative for ischemia.  No indication for ischemic testing at this time.  Recommend echo for structural and functional evaluation.  Continue statin.    Acute on chronic hyponatremia, serum osm < 280.  Improvement in serum Na with NS IVF bolus in ED.  Patient endorses poor hydration.  Nephrology following.  Urine lytes

## 2025-04-03 NOTE — PROGRESS NOTES
Pt awake in bed and watching tv. VSS and daughter at bedside. Denies any needs. Call light in reach and bed alarm engaged.

## 2025-04-03 NOTE — CONSULTS
MD Wesly Bragg MD  Augusto HernandezDO                 Office: [phone]                      Fax: [phone]             [employer]                   NEPHROLOGY CONSULT INITIAL NOTE        IMPRESSION/RECOMMENDATIONS:        #hyponatremia  -acute on chronic  -baseline serum Na 130-133 since 11/2023  -Na on presentation 126, TSH 6.0  -Na slowly improving after 500 cc IVF slowly in ED. was placed NPO overnight after admission.  -uric acid 4.9 wnl  -hold off on urine -lytes today (had CTPA 4/2 and will convolute results). Tomorrow check urine na, urine osm, am cortisol. Serum osm pending.  -already self correcting, would monitor for now. Okay with PO intake to thirst.    #leukocyturia  -UA WBC 36s, no bacteria. Serum WBC 7.5 with abs eos 0.1.  -follow-up UCx.    #hypomagnesemia  -s/p mag sulf 2g IV  -monitor    #HTN  -resume home losartan.    ?hypovolemia  -BP 95/59 initially, BNP 3101  -s/p CTPA 4/2    #anemia  -unspecific  -hgb stable, monitor    #sob  -per primary    Thank you for the consult.  We will follow this patient along the hospitalization.  Augusto Hernandez DO     High complexity, at risk of impending organ failure needing  higher level of care/monitoring.   Time spent 41 minutes that included face-to-face meeting/discussion with patient, and treatment team (including primary/referring team and other consultants; included coordination of care with the treatment team; and review of patient's electronic medical records and ordering appropriates tests.             Reason for Consult:  hyponatremia  Requesting Physician/Provider:  Mariela Lopez PA-C     CHIEF COMPLAINT:  dyspnea, tachycardia    History obtained from records and patient.    HISTORY OF PRESENT ILLNESS:                Fatimah Hernández  is 89 y.o. y.o. female with significant past medical history of HTN, chronic hyponatremia, Vitamin D deficiency who presented with dyspnea, tachycardia.  Patient recently had left

## 2025-04-03 NOTE — CONSULTS
MD Wesly Bragg MD Aldo Estella, DO              Office: [phone]                      Fax: [phone]               [email or web address]                     Nephrology consult received. Full consult report will follow.     Thank you for allowing us to participate in this patient's care. Please do not hesitate to contact us anytime. We will follow along with you.         Augusto Hernandez DO

## 2025-04-04 LAB
ALBUMIN SERPL-MCNC: 3.4 G/DL (ref 3.4–5)
ALBUMIN/GLOB SERPL: 1.3 {RATIO} (ref 1.1–2.2)
ALP SERPL-CCNC: 91 U/L (ref 40–129)
ALT SERPL-CCNC: 11 U/L (ref 10–40)
ANION GAP SERPL CALCULATED.3IONS-SCNC: 8 MMOL/L (ref 3–16)
AST SERPL-CCNC: 20 U/L (ref 15–37)
BASOPHILS # BLD: 0 K/UL (ref 0–0.2)
BASOPHILS NFR BLD: 0.7 %
BILIRUB SERPL-MCNC: 0.6 MG/DL (ref 0–1)
BUN SERPL-MCNC: 9 MG/DL (ref 7–20)
CALCIUM SERPL-MCNC: 8.2 MG/DL (ref 8.3–10.6)
CHLORIDE SERPL-SCNC: 91 MMOL/L (ref 99–110)
CO2 SERPL-SCNC: 25 MMOL/L (ref 21–32)
CORTIS AM PEAK SERPL-MCNC: 12 UG/DL (ref 4.3–22.4)
CREAT SERPL-MCNC: 0.8 MG/DL (ref 0.6–1.2)
DEPRECATED RDW RBC AUTO: 14.6 % (ref 12.4–15.4)
ECHO AO ASC DIAM: 4 CM
ECHO AO ASCENDING AORTA INDEX: 2.12 CM/M2
ECHO AO ROOT DIAM: 3.6 CM
ECHO AO ROOT INDEX: 1.9 CM/M2
ECHO AR MAX VEL PISA: 3.2 M/S
ECHO AV AREA PEAK VELOCITY: 2.6 CM2
ECHO AV AREA VTI: 2.4 CM2
ECHO AV AREA/BSA PEAK VELOCITY: 1.4 CM2/M2
ECHO AV AREA/BSA VTI: 1.3 CM2/M2
ECHO AV CUSP MM: 1.7 CM
ECHO AV MEAN GRADIENT: 8 MMHG
ECHO AV MEAN VELOCITY: 1.3 M/S
ECHO AV PEAK GRADIENT: 14 MMHG
ECHO AV PEAK VELOCITY: 1.9 M/S
ECHO AV REGURGITANT PHT: 579 MS
ECHO AV VELOCITY RATIO: 0.89
ECHO AV VTI: 38.6 CM
ECHO BSA: 1.92 M2
ECHO IVC PROX: 1.6 CM
ECHO LA AREA 2C: 22.7 CM2
ECHO LA AREA 4C: 17.7 CM2
ECHO LA DIAMETER INDEX: 2.38 CM/M2
ECHO LA DIAMETER: 4.5 CM
ECHO LA MAJOR AXIS: 5.2 CM
ECHO LA MINOR AXIS: 6.3 CM
ECHO LA TO AORTIC ROOT RATIO: 1.25
ECHO LA VOL BP: 62 ML (ref 22–52)
ECHO LA VOL MOD A2C: 66 ML (ref 22–52)
ECHO LA VOL MOD A4C: 47 ML (ref 22–52)
ECHO LA VOL/BSA BIPLANE: 33 ML/M2 (ref 16–34)
ECHO LA VOLUME INDEX MOD A2C: 35 ML/M2 (ref 16–34)
ECHO LA VOLUME INDEX MOD A4C: 25 ML/M2 (ref 16–34)
ECHO LV E' LATERAL VELOCITY: 11 CM/S
ECHO LV E' SEPTAL VELOCITY: 12.2 CM/S
ECHO LV EDV A2C: 41 ML
ECHO LV EDV A4C: 62 ML
ECHO LV EDV INDEX A4C: 33 ML/M2
ECHO LV EDV NDEX A2C: 22 ML/M2
ECHO LV EJECTION FRACTION 3D: 65 %
ECHO LV EJECTION FRACTION A2C: 58 %
ECHO LV EJECTION FRACTION A4C: 66 %
ECHO LV ESV A2C: 18 ML
ECHO LV ESV A4C: 21 ML
ECHO LV ESV INDEX A2C: 10 ML/M2
ECHO LV ESV INDEX A4C: 11 ML/M2
ECHO LV FRACTIONAL SHORTENING: 32 % (ref 28–44)
ECHO LV INTERNAL DIMENSION DIASTOLE INDEX: 2.49 CM/M2
ECHO LV INTERNAL DIMENSION DIASTOLIC: 4.7 CM (ref 3.9–5.3)
ECHO LV INTERNAL DIMENSION SYSTOLIC INDEX: 1.69 CM/M2
ECHO LV INTERNAL DIMENSION SYSTOLIC: 3.2 CM
ECHO LV IVSD: 1.1 CM (ref 0.6–0.9)
ECHO LV MASS 2D: 187.5 G (ref 67–162)
ECHO LV MASS INDEX 2D: 99.2 G/M2 (ref 43–95)
ECHO LV POSTERIOR WALL DIASTOLIC: 1.1 CM (ref 0.6–0.9)
ECHO LV RELATIVE WALL THICKNESS RATIO: 0.47
ECHO LVOT AREA: 2.8 CM2
ECHO LVOT AV VTI INDEX: 0.85
ECHO LVOT DIAM: 1.9 CM
ECHO LVOT MEAN GRADIENT: 5 MMHG
ECHO LVOT PEAK GRADIENT: 12 MMHG
ECHO LVOT PEAK VELOCITY: 1.7 M/S
ECHO LVOT STROKE VOLUME INDEX: 49.3 ML/M2
ECHO LVOT SV: 93.2 ML
ECHO LVOT VTI: 32.9 CM
ECHO MV A VELOCITY: 0.74 M/S
ECHO MV AREA VTI: 2.9 CM2
ECHO MV E DECELERATION TIME (DT): 174 MS
ECHO MV E VELOCITY: 1.13 M/S
ECHO MV E/A RATIO: 1.53
ECHO MV E/E' LATERAL: 10.27
ECHO MV E/E' RATIO (AVERAGED): 9.77
ECHO MV E/E' SEPTAL: 9.26
ECHO MV LVOT VTI INDEX: 0.96
ECHO MV MAX VELOCITY: 1.2 M/S
ECHO MV MEAN GRADIENT: 2 MMHG
ECHO MV MEAN VELOCITY: 0.7 M/S
ECHO MV PEAK GRADIENT: 6 MMHG
ECHO MV VTI: 31.7 CM
ECHO PV MAX VELOCITY: 1.2 M/S
ECHO PV MEAN GRADIENT: 3 MMHG
ECHO PV MEAN VELOCITY: 0.8 M/S
ECHO PV PEAK GRADIENT: 5 MMHG
ECHO PV VTI: 21.9 CM
ECHO RA AREA 4C: 16.9 CM2
ECHO RA END SYSTOLIC VOLUME APICAL 4 CHAMBER INDEX BSA: 24 ML/M2
ECHO RA VOLUME: 45 ML
ECHO RV BASAL DIMENSION: 3.2 CM
ECHO RV FREE WALL PEAK S': 14.8 CM/S
ECHO RV LONGITUDINAL DIMENSION: 6.5 CM
ECHO RV MID DIMENSION: 2.4 CM
ECHO RV TAPSE: 2 CM (ref 1.7–?)
ECHO TV REGURGITANT MAX VELOCITY: 1.9 M/S
ECHO TV REGURGITANT PEAK GRADIENT: 14 MMHG
EOSINOPHIL # BLD: 0.4 K/UL (ref 0–0.6)
EOSINOPHIL NFR BLD: 5.7 %
GFR SERPLBLD CREATININE-BSD FMLA CKD-EPI: 70 ML/MIN/{1.73_M2}
GLUCOSE SERPL-MCNC: 105 MG/DL (ref 70–99)
HCT VFR BLD AUTO: 26 % (ref 36–48)
HGB BLD-MCNC: 9 G/DL (ref 12–16)
LYMPHOCYTES # BLD: 1.7 K/UL (ref 1–5.1)
LYMPHOCYTES NFR BLD: 25.6 %
MAGNESIUM SERPL-MCNC: 2 MG/DL (ref 1.8–2.4)
MCH RBC QN AUTO: 33.5 PG (ref 26–34)
MCHC RBC AUTO-ENTMCNC: 34.4 G/DL (ref 31–36)
MCV RBC AUTO: 97.4 FL (ref 80–100)
MONOCYTES # BLD: 0.7 K/UL (ref 0–1.3)
MONOCYTES NFR BLD: 10.5 %
NEUTROPHILS # BLD: 3.8 K/UL (ref 1.7–7.7)
NEUTROPHILS NFR BLD: 57.5 %
OSMOLALITY UR: 175 MOSM/KG (ref 390–1070)
PLATELET # BLD AUTO: 377 K/UL (ref 135–450)
PMV BLD AUTO: 6.9 FL (ref 5–10.5)
POTASSIUM SERPL-SCNC: 4.2 MMOL/L (ref 3.5–5.1)
PROT SERPL-MCNC: 6 G/DL (ref 6.4–8.2)
RBC # BLD AUTO: 2.67 M/UL (ref 4–5.2)
SODIUM SERPL-SCNC: 124 MMOL/L (ref 136–145)
SODIUM SERPL-SCNC: 127 MMOL/L (ref 136–145)
SODIUM UR-SCNC: 28 MMOL/L
WBC # BLD AUTO: 6.7 K/UL (ref 4–11)

## 2025-04-04 PROCEDURE — 6370000000 HC RX 637 (ALT 250 FOR IP): Performed by: INTERNAL MEDICINE

## 2025-04-04 PROCEDURE — 84300 ASSAY OF URINE SODIUM: CPT

## 2025-04-04 PROCEDURE — 82533 TOTAL CORTISOL: CPT

## 2025-04-04 PROCEDURE — 83735 ASSAY OF MAGNESIUM: CPT

## 2025-04-04 PROCEDURE — G0378 HOSPITAL OBSERVATION PER HR: HCPCS

## 2025-04-04 PROCEDURE — 97110 THERAPEUTIC EXERCISES: CPT

## 2025-04-04 PROCEDURE — 2580000003 HC RX 258: Performed by: INTERNAL MEDICINE

## 2025-04-04 PROCEDURE — 83935 ASSAY OF URINE OSMOLALITY: CPT

## 2025-04-04 PROCEDURE — 6370000000 HC RX 637 (ALT 250 FOR IP): Performed by: PHYSICIAN ASSISTANT

## 2025-04-04 PROCEDURE — 97161 PT EVAL LOW COMPLEX 20 MIN: CPT

## 2025-04-04 PROCEDURE — 80053 COMPREHEN METABOLIC PANEL: CPT

## 2025-04-04 PROCEDURE — 6370000000 HC RX 637 (ALT 250 FOR IP): Performed by: NURSE PRACTITIONER

## 2025-04-04 PROCEDURE — 2500000003 HC RX 250 WO HCPCS: Performed by: PHYSICIAN ASSISTANT

## 2025-04-04 PROCEDURE — 99233 SBSQ HOSP IP/OBS HIGH 50: CPT | Performed by: INTERNAL MEDICINE

## 2025-04-04 PROCEDURE — 36415 COLL VENOUS BLD VENIPUNCTURE: CPT

## 2025-04-04 PROCEDURE — 97530 THERAPEUTIC ACTIVITIES: CPT

## 2025-04-04 PROCEDURE — 93306 TTE W/DOPPLER COMPLETE: CPT | Performed by: INTERNAL MEDICINE

## 2025-04-04 PROCEDURE — 1200000000 HC SEMI PRIVATE

## 2025-04-04 PROCEDURE — 85025 COMPLETE CBC W/AUTO DIFF WBC: CPT

## 2025-04-04 PROCEDURE — 84295 ASSAY OF SERUM SODIUM: CPT

## 2025-04-04 RX ORDER — SODIUM CHLORIDE 9 MG/ML
INJECTION, SOLUTION INTRAVENOUS CONTINUOUS
Status: DISCONTINUED | OUTPATIENT
Start: 2025-04-04 | End: 2025-04-04

## 2025-04-04 RX ORDER — 0.9 % SODIUM CHLORIDE 0.9 %
250 INTRAVENOUS SOLUTION INTRAVENOUS ONCE
Status: DISCONTINUED | OUTPATIENT
Start: 2025-04-04 | End: 2025-04-04

## 2025-04-04 RX ORDER — SODIUM CHLORIDE 9 MG/ML
INJECTION, SOLUTION INTRAVENOUS CONTINUOUS
Status: DISCONTINUED | OUTPATIENT
Start: 2025-04-04 | End: 2025-04-05

## 2025-04-04 RX ADMIN — LEVOTHYROXINE SODIUM 50 MCG: 0.03 TABLET ORAL at 05:10

## 2025-04-04 RX ADMIN — SODIUM CHLORIDE: 0.9 INJECTION, SOLUTION INTRAVENOUS at 14:36

## 2025-04-04 RX ADMIN — Medication 10 ML: at 08:52

## 2025-04-04 RX ADMIN — MELATONIN TAB 3 MG 3 MG: 3 TAB at 20:27

## 2025-04-04 RX ADMIN — ACETAMINOPHEN 650 MG: 325 TABLET ORAL at 20:27

## 2025-04-04 RX ADMIN — LOSARTAN POTASSIUM 50 MG: 25 TABLET, FILM COATED ORAL at 08:52

## 2025-04-04 RX ADMIN — SODIUM CHLORIDE: 0.9 INJECTION, SOLUTION INTRAVENOUS at 16:15

## 2025-04-04 RX ADMIN — METOPROLOL SUCCINATE 25 MG: 25 TABLET, EXTENDED RELEASE ORAL at 20:26

## 2025-04-04 RX ADMIN — APIXABAN 5 MG: 5 TABLET, FILM COATED ORAL at 08:52

## 2025-04-04 RX ADMIN — CITALOPRAM 20 MG: 20 TABLET, FILM COATED ORAL at 08:51

## 2025-04-04 RX ADMIN — Medication 10 ML: at 20:29

## 2025-04-04 RX ADMIN — APIXABAN 5 MG: 5 TABLET, FILM COATED ORAL at 20:26

## 2025-04-04 RX ADMIN — ATORVASTATIN CALCIUM 80 MG: 80 TABLET, FILM COATED ORAL at 08:52

## 2025-04-04 ASSESSMENT — ENCOUNTER SYMPTOMS
CHEST TIGHTNESS: 0
EYE DISCHARGE: 0
DIARRHEA: 0
BLOOD IN STOOL: 0
CONSTIPATION: 0
ABDOMINAL PAIN: 0
COUGH: 0
FACIAL SWELLING: 0
NAUSEA: 0
PHOTOPHOBIA: 0
VOMITING: 0
ABDOMINAL DISTENTION: 0
EYE REDNESS: 0
SHORTNESS OF BREATH: 0

## 2025-04-04 ASSESSMENT — PAIN SCALES - GENERAL
PAINLEVEL_OUTOF10: 0
PAINLEVEL_OUTOF10: 0

## 2025-04-04 NOTE — PROGRESS NOTES
[facility]      GENERAL CARDIOLOGY PROGRESS NOTE  [phone]          Chief Complaint   Patient presents with    Abnormal Lab     Pt states that she had a knew replacement and had labs drawn on 3/27 by PCP. Pt states that cardiologist saw the results and told her to come to ER due to weakness, SOB, and lightheadedness. Pt states WBC were high and Hgb was low.         ASSESSMENT/PLAN:  Tachycardia  Lightheaded  PAF on OAC  Elevated troponin  CAC on CT  Hyponatremia  HTN    Presenting EKG demonstrates a regular, narrow complex tachycardia.  Given patient's history of paroxysmal atrial fibrillation, atrial flutter is a consideration.  PAF seen on inpatient telemetry during day one of visit, maintaining sinus rhythm.  Suspect etiology related to electrolyte disturbance and recent surgery stress response.  TSH WNL.  Toprol XL started, Losartan decreased to accommodate for BP changes.  She is anticoagulated on Eliquis 5 mg twice daily, appropriate dosing for age, weight and renal function.  Recommend 30d monitor placement at discharge.      Lightheadedness with standing after surgical procedure.  Possibly prior to this but patient does not recall.  Orthostatic VS negative.  Echo with hyperdynamic function, increased LVOT gradient without obstruction.  Clinical picture may be suggestive of  fluid depletion.  May benefit from IVF hydration.  Would appreciate Nephrology input given hyponatremia workup.      Elevated troponin is mild, adynamic not indicative of ACS.  ECG does not demonstrate any acute ischemic findings.  Patient denies anginal symptoms.  Incidentally, CAC seen on CT.  Findings discussed with patient.  Recent stress test December 31, 2024 was negative for ischemia.  No indication for ischemic testing at this time.  Echo this visit with preserved LVEF without WMA.      Acute on chronic hyponatremia, serum osm < 280.  Improvement in serum Na with NS IVF bolus in ED.  Serum Na has since declined.     1. Left ventricle: The cavity size was normal. Wall thickness was     normal. Systolic function was normal. The estimated ejection     fraction was in the range of 55% to 60%. Wall motion was normal;     there were no regional wall motion abnormalities. Left     ventricular diastolic function parameters were normal for the     patient's age. Global longitudinal strain rate of 17.50%. The     longitudal strain study is borderline abnormal.  2. Aortic valve: There was trivial regurgitation.  3. Mitral valve: The annulus was mildly calcified. The leaflets     were normal thickness and mildly calcified.  4. Left atrium: The atrium was mildly to moderately dilated.  5. Right ventricle: The cavity size was normal. Wall thickness was     normal. Systolic function was normal.  6. Tricuspid valve: The tricuspid jet envelope definition was     inadequate to estimate right ventricular systolic pressure.  7. Pericardium, extracardiac: There was no pericardial effusion.     Impressions:  No significant change from previous exam.    01/22/25    NM STRESS TEST WITH MYOCARDIAL PERFUSION 01/22/2025  3:11 PM (Final)    Interpretation Summary    Stress Combined Conclusion: Normal pharmacological myocardial perfusion study. Findings suggest a low risk of cardiac events.    Perfusion Comments: LV perfusion is normal. There is no evidence of inducible ischemia.    Stress Function: Left ventricular function post-stress is normal. Post-stress ejection fraction is 78%. Stress end diastolic volume: 79 mL. Stress end systolic volume: 17 mL. LV mass: 115.0 g.    Perfusion Conclusion: TID ratio is 0.90.    ECG: The stress ECG was not diagnostic due to pharmacologic protocol.    Signed by: Mohan Foster on 1/22/2025  3:11 PM

## 2025-04-04 NOTE — PROGRESS NOTES
Occupational Therapy  Fatimah Hernández    Explained purpose of OT. Patient stated is performing ADLs independently. Stated received OT after TKR 3 weeks ago and doing well.     Patient stated does not want OT at this time.   Will d/c OT orders at this time due to patient request.     Thank you,  Raisa Burnett, OTR/L [id number]

## 2025-04-04 NOTE — PLAN OF CARE
Problem: Discharge Planning  Goal: Discharge to home or other facility with appropriate resources  Outcome: Progressing     Problem: Safety - Adult  Goal: Free from fall injury  Outcome: Progressing     Problem: ABCDS Injury Assessment  Goal: Absence of physical injury  Outcome: Progressing     Problem: Cardiovascular - Adult  Goal: Maintains optimal cardiac output and hemodynamic stability  Outcome: Progressing     Problem: Skin/Tissue Integrity - Adult  Goal: Skin integrity remains intact  Description: 1.  Monitor for areas of redness and/or skin breakdown  2.  Assess vascular access sites hourly  3.  Every 4-6 hours minimum:  Change oxygen saturation probe site  4.  Every 4-6 hours:  If on nasal continuous positive airway pressure, respiratory therapy assess nares and determine need for appliance change or resting period  Outcome: Progressing  Goal: Incisions, wounds, or drain sites healing without S/S of infection  Outcome: Progressing     Problem: Musculoskeletal - Adult  Goal: Return mobility to safest level of function  Outcome: Progressing  Goal: Maintain proper alignment of affected body part  Outcome: Progressing     Problem: Genitourinary - Adult  Goal: Absence of urinary retention  Outcome: Progressing     Problem: Skin/Tissue Integrity  Goal: Skin integrity remains intact  Description: 1.  Monitor for areas of redness and/or skin breakdown  2.  Assess vascular access sites hourly  3.  Every 4-6 hours minimum:  Change oxygen saturation probe site  4.  Every 4-6 hours:  If on nasal continuous positive airway pressure, respiratory therapy assess nares and determine need for appliance change or resting period  Outcome: Progressing

## 2025-04-04 NOTE — CONSULTS
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     History of Present Illness:    89-year-old female with a past medical history of hypertension, hyperlipidemia who presented to the hospital for tachycardia.  She was incidentally noted on CT scans to have a possible peripheral nerve sheath tumor.  This was further evaluated with MRI of the thoracic spine which showed well-circumscribed ovoid enhancing lesion within the right paraspinal soft tissues at T11 vertebrae measuring roughly about 1.9 cm in maximal diameter for which hematology/oncology was consulted.     Past Medical History:     has a past medical history of Anxiety, Hyperlipidemia, and Hypertension.         Past Surgical History:    Past Surgical History:   Procedure Laterality Date    BONY PELVIS SURGERY      repair fx pelvis    COLONOSCOPY N/A 01/22/2024    COLONOSCOPY WITH BIOPSY performed by Durga Mendes MD at [facility] ENDOSCOPY    HYSTERECTOMY (CERVIX STATUS UNKNOWN)      1955    HYSTERECTOMY, TOTAL ABDOMINAL (CERVIX REMOVED)      PARTIAL HIP ARTHROPLASTY Left             Current Medications:     losartan  50 mg Oral Daily    metoprolol succinate  25 mg Oral QHS    atorvastatin  80 mg Oral Daily    citalopram  20 mg Oral Daily    apixaban  5 mg Oral BID    levothyroxine  50 mcg Oral Daily    sodium chloride flush  5-40 mL IntraVENous 2 times per day           Allergies:    No Known Allergies     Social History:    reports that she quit smoking about 42 years ago. Her smoking use included cigarettes. She has never used smokeless tobacco. She  04/04/25  0509   AST 22 20   ALT 13 11   BILITOT 0.8 0.6   ALKPHOS 101 91     PT/INR:  No results found for: \"PROTIME\", \"INR\"        IMPRESSION/RECOMMENDATIONS:      89-year-old female with a past medical history of hypertension, hyperlipidemia who presented to the hospital for tachycardia.  She was incidentally noted on CT scans to have a possible peripheral nerve sheath tumor.  This was further evaluated with MRI of the thoracic spine which showed well-circumscribed ovoid enhancing lesion within the right paraspinal soft tissues at T11 vertebrae measuring roughly about 1.9 cm in maximal diameter for which hematology/oncology was consulted.    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  - This is not unreasonable given her age and comorbidities  - I instructed her to reach out to her doctor in case she develops significant back pain so that we can image her in the future as an outpatient if needed  - No inpatient oncological intervention      Ben Gresham MD  [facility]

## 2025-04-04 NOTE — PROGRESS NOTES
[facility] - Inpatient Rehabilitation Department   Phone: [phone]    Physical Therapy    [x] Initial Evaluation            [] Daily Treatment Note         [] Discharge Summary      Patient: Fatimah Hernández   : 10/25/1934   MRN: [id number]   Date of Service:  2025  Admitting Diagnosis: Dyspnea on exertion  Current Admission Summary: Fatimah Hernández is a 89 y.o. female who presented to ED for evaluation of tachycardia.  Patient had a left knee replacement 3 weeks ago and reports her pain has been improving and she has been doing well.  She reports she has noticed some lightheadedness, fatigue and some shortness of breath with exertion and when nursing staff came to her house today they noted her heart rate was elevated and recommended she be evaluated in the ED.  Patient had labs drawn last week that revealed some leukocytosis and anemia.  Patient denies chest pain, shortness of breath at rest, cough, edema, GI symptoms, urinary symptoms.  She denies any other complaints or concerns at this time.   Past Medical History:  has a past medical history of Anxiety, Hyperlipidemia, and Hypertension.  Past Surgical History:  has a past surgical history that includes Hysterectomy; Partial hip arthroplasty (Left); Bony pelvis surgery; Colonoscopy (N/A, 2024); and Hysterectomy, total abdominal.  Discharge Recommendations: Fatimah Hernández scored a 20/24 on the AM-PAC short mobility form. Current research shows that an AM-PAC score of 18 or greater is typically associated with a discharge to the patient's home setting. Based on the patient's AM-PAC score and their current functional mobility deficits, it is recommended that the patient have 2-3 sessions per week of Physical Therapy at d/c to increase the patient's independence.  At this time, this patient demonstrates the endurance and safety to discharge home with home services and a follow up treatment frequency of 2-3x/wk.  Please see

## 2025-04-04 NOTE — PROGRESS NOTES
[facility] Note    Patient Active Problem List   Diagnosis    Other hyperlipidemia    Renovascular hypertension    Hypertension    TIA (transient ischemic attack)    Allergic rhinitis    Functional constipation    Decreased diffusion capacity of lung    GERD (gastroesophageal reflux disease)    Hypothyroidism due to Hashimoto's thyroiditis    Hallux rigidus    MGUS (monoclonal gammopathy of unknown significance)    Polymyalgia rheumatica syndrome    Postmenopausal osteoporosis    Primary osteoarthritis involving multiple joints    Primary osteoarthritis of right knee    Stage 3a chronic kidney disease (HCC)    Vitamin D deficiency    CVD (cerebrovascular disease)    Anxiety    Paroxysmal atrial fibrillation (HCC)    Secondary hypercoagulable state    History of stroke    Dyspnea on exertion       Past Medical History:   has a past medical history of Anxiety, Hyperlipidemia, and Hypertension.    Past Social History:   reports that she quit smoking about 42 years ago. Her smoking use included cigarettes. She has never used smokeless tobacco. She reports current alcohol use. She reports that she does not use drugs.    Subjective:    No new neurologic symptoms  Review of Systems   Constitutional:  Positive for fatigue. Negative for activity change, appetite change, chills, fever and unexpected weight change.   HENT:  Negative for congestion and facial swelling.    Eyes:  Negative for photophobia, discharge and redness.   Respiratory:  Negative for cough, chest tightness and shortness of breath.    Cardiovascular:  Negative for chest pain, palpitations and leg swelling.   Gastrointestinal:  Negative for abdominal distention, abdominal pain, blood in stool, constipation, diarrhea, nausea and vomiting.   Endocrine: Negative for cold intolerance, heat intolerance and polyuria.   Genitourinary:  Negative for decreased urine volume, difficulty urinating, flank pain and hematuria.

## 2025-04-05 VITALS
SYSTOLIC BLOOD PRESSURE: 123 MMHG | WEIGHT: 174.6 LBS | HEIGHT: 66 IN | OXYGEN SATURATION: 99 % | BODY MASS INDEX: 28.06 KG/M2 | RESPIRATION RATE: 16 BRPM | DIASTOLIC BLOOD PRESSURE: 58 MMHG | TEMPERATURE: 98.5 F | HEART RATE: 56 BPM

## 2025-04-05 LAB
ALBUMIN SERPL-MCNC: 3.3 G/DL (ref 3.4–5)
ALBUMIN/GLOB SERPL: 1.3 {RATIO} (ref 1.1–2.2)
ALP SERPL-CCNC: 88 U/L (ref 40–129)
ALT SERPL-CCNC: 10 U/L (ref 10–40)
ANION GAP SERPL CALCULATED.3IONS-SCNC: 7 MMOL/L (ref 3–16)
AST SERPL-CCNC: 19 U/L (ref 15–37)
BASOPHILS # BLD: 0 K/UL (ref 0–0.2)
BASOPHILS NFR BLD: 0.7 %
BILIRUB SERPL-MCNC: 0.5 MG/DL (ref 0–1)
BUN SERPL-MCNC: 9 MG/DL (ref 7–20)
CALCIUM SERPL-MCNC: 8.5 MG/DL (ref 8.3–10.6)
CHLORIDE SERPL-SCNC: 97 MMOL/L (ref 99–110)
CO2 SERPL-SCNC: 23 MMOL/L (ref 21–32)
CREAT SERPL-MCNC: 0.7 MG/DL (ref 0.6–1.2)
DEPRECATED RDW RBC AUTO: 14.6 % (ref 12.4–15.4)
EOSINOPHIL # BLD: 0.3 K/UL (ref 0–0.6)
EOSINOPHIL NFR BLD: 5.5 %
GFR SERPLBLD CREATININE-BSD FMLA CKD-EPI: 82 ML/MIN/{1.73_M2}
GLUCOSE SERPL-MCNC: 97 MG/DL (ref 70–99)
HCT VFR BLD AUTO: 25.8 % (ref 36–48)
HGB BLD-MCNC: 8.9 G/DL (ref 12–16)
LYMPHOCYTES # BLD: 1.6 K/UL (ref 1–5.1)
LYMPHOCYTES NFR BLD: 26.6 %
MAGNESIUM SERPL-MCNC: 1.87 MG/DL (ref 1.8–2.4)
MCH RBC QN AUTO: 33.6 PG (ref 26–34)
MCHC RBC AUTO-ENTMCNC: 34.6 G/DL (ref 31–36)
MCV RBC AUTO: 97.2 FL (ref 80–100)
MONOCYTES # BLD: 0.6 K/UL (ref 0–1.3)
MONOCYTES NFR BLD: 10.4 %
NEUTROPHILS # BLD: 3.5 K/UL (ref 1.7–7.7)
NEUTROPHILS NFR BLD: 56.8 %
PLATELET # BLD AUTO: 358 K/UL (ref 135–450)
PMV BLD AUTO: 6.7 FL (ref 5–10.5)
POTASSIUM SERPL-SCNC: 4.3 MMOL/L (ref 3.5–5.1)
PROT SERPL-MCNC: 5.8 G/DL (ref 6.4–8.2)
RBC # BLD AUTO: 2.65 M/UL (ref 4–5.2)
SODIUM SERPL-SCNC: 127 MMOL/L (ref 136–145)
WBC # BLD AUTO: 6.1 K/UL (ref 4–11)

## 2025-04-05 PROCEDURE — 6370000000 HC RX 637 (ALT 250 FOR IP): Performed by: INTERNAL MEDICINE

## 2025-04-05 PROCEDURE — 36415 COLL VENOUS BLD VENIPUNCTURE: CPT

## 2025-04-05 PROCEDURE — 80053 COMPREHEN METABOLIC PANEL: CPT

## 2025-04-05 PROCEDURE — 6370000000 HC RX 637 (ALT 250 FOR IP): Performed by: PHYSICIAN ASSISTANT

## 2025-04-05 PROCEDURE — 83735 ASSAY OF MAGNESIUM: CPT

## 2025-04-05 PROCEDURE — 85025 COMPLETE CBC W/AUTO DIFF WBC: CPT

## 2025-04-05 PROCEDURE — 2580000003 HC RX 258: Performed by: INTERNAL MEDICINE

## 2025-04-05 PROCEDURE — 2500000003 HC RX 250 WO HCPCS: Performed by: PHYSICIAN ASSISTANT

## 2025-04-05 RX ORDER — METOPROLOL SUCCINATE 25 MG/1
25 TABLET, EXTENDED RELEASE ORAL
Qty: 30 TABLET | Refills: 3 | Status: SHIPPED | OUTPATIENT
Start: 2025-04-05

## 2025-04-05 RX ORDER — SODIUM CHLORIDE 1 G/1
1 TABLET ORAL
Status: DISCONTINUED | OUTPATIENT
Start: 2025-04-05 | End: 2025-04-05 | Stop reason: HOSPADM

## 2025-04-05 RX ORDER — LOSARTAN POTASSIUM 50 MG/1
50 TABLET ORAL DAILY
Qty: 30 TABLET | Refills: 3 | Status: SHIPPED | OUTPATIENT
Start: 2025-04-06

## 2025-04-05 RX ORDER — SODIUM CHLORIDE 1 G/1
1 TABLET ORAL
Qty: 42 TABLET | Refills: 0 | Status: SHIPPED | OUTPATIENT
Start: 2025-04-05 | End: 2025-04-19

## 2025-04-05 RX ORDER — SODIUM CHLORIDE 1 G/1
1 TABLET ORAL
Status: DISCONTINUED | OUTPATIENT
Start: 2025-04-05 | End: 2025-04-05

## 2025-04-05 RX ADMIN — APIXABAN 5 MG: 5 TABLET, FILM COATED ORAL at 09:10

## 2025-04-05 RX ADMIN — ACETAMINOPHEN 650 MG: 325 TABLET ORAL at 09:09

## 2025-04-05 RX ADMIN — Medication 1 G: at 09:25

## 2025-04-05 RX ADMIN — Medication 10 ML: at 09:26

## 2025-04-05 RX ADMIN — SODIUM CHLORIDE: 0.9 INJECTION, SOLUTION INTRAVENOUS at 09:16

## 2025-04-05 RX ADMIN — LOSARTAN POTASSIUM 50 MG: 25 TABLET, FILM COATED ORAL at 09:10

## 2025-04-05 RX ADMIN — CITALOPRAM 20 MG: 20 TABLET, FILM COATED ORAL at 09:10

## 2025-04-05 RX ADMIN — ATORVASTATIN CALCIUM 80 MG: 80 TABLET, FILM COATED ORAL at 09:10

## 2025-04-05 RX ADMIN — LEVOTHYROXINE SODIUM 50 MCG: 0.03 TABLET ORAL at 05:27

## 2025-04-05 ASSESSMENT — ENCOUNTER SYMPTOMS
FACIAL SWELLING: 0
PHOTOPHOBIA: 0
SHORTNESS OF BREATH: 0
ABDOMINAL DISTENTION: 0
CHEST TIGHTNESS: 0
EYE DISCHARGE: 0
COUGH: 0
EYE REDNESS: 0
ABDOMINAL PAIN: 0
DIARRHEA: 0
NAUSEA: 0
BLOOD IN STOOL: 0
VOMITING: 0
CONSTIPATION: 0

## 2025-04-05 ASSESSMENT — PAIN SCALES - GENERAL: PAINLEVEL_OUTOF10: 3

## 2025-04-05 ASSESSMENT — PAIN DESCRIPTION - LOCATION: LOCATION: BACK

## 2025-04-05 ASSESSMENT — PAIN DESCRIPTION - DESCRIPTORS: DESCRIPTORS: ACHING

## 2025-04-05 ASSESSMENT — PAIN DESCRIPTION - ORIENTATION: ORIENTATION: LOWER

## 2025-04-05 NOTE — DISCHARGE SUMMARY
V2.0  Discharge Summary    Name:  Fatimah Hernández /Age/Sex: 10/25/1934 (89 y.o. female)   Admit Date: 2025  Discharging Provider: Andrew Bullard MD   MRN & CSN:  [id number] & [id number] Attending:  Andrew Bullard MD       Brief HPI: Fatimah Hernández is a 89 y.o. female with PMHx of p. Afib on Eliquis, Paraspinal mass, Hypothyroidism, chronic hyponatremia who was admitted with dyspnea.  Patient was seen by cardiology.  She underwent an echocardiogram which showed preserved LVEF.  Patient also had hyponatremia.  She was seen and evaluated by nephrology.  She was started on IV fluids and salt tablets.  Patient's sodium improved to 127 and was discharged with salt tablets and follow-up with nephrology in the outpatient setting.    Brief Problem Focused Hospital Course:     Dyspnea on exertion.  Elevated troponin.  Coronary calcification on CT  - Patient denies chest pain  - Troponin 12 ?  28  - EKG shows sinus tachycardia rate 115, RBBB which appears to be new compared to prior EKGs  - Monitor on telemetry  - Cardiology consulted     Acute on chronic hyponatremia  - Na 126, baseline ~130-133  - 500 ml bolus IV NS administered in ED  - Check Urine Sodium, Urine Creatinine, Urine and serum osmolarity, TSH, cortisol and uric acid   - Monitor Na  - Nephrology consulted     Paraspinal mass  - Incidental 2 cm right paraspinal mass noted at the T10 level.  - MRI ordered     Hypothyroidism  - Continue home Synthroid  - Check TSH     Paroxysmal A-fib  - Continue home Eliquis  - Not currently on any rate control or antiarrhythmic medication    The patient expressed appropriate understanding of, and agreement with the discharge recommendations, medications, and plan.     Consults this admission:  IP CONSULT TO HOSPITALIST  IP CONSULT TO CARDIOLOGY  IP CONSULT TO NEPHROLOGY  IP CONSULT TO NEPHROLOGY  IP CONSULT TO HOME CARE NEEDS    Discharge Instruction:   Primary care physician: Alexander Day MD within 2    Component Value Date/Time    NITRU Negative 04/02/2025 02:51 PM    COLORU Yellow 04/02/2025 02:51 PM    PHUR 7.5 04/02/2025 02:51 PM    PHUR 7.0 03/27/2025 02:58 PM    PHUR 6.0 03/30/2023 04:09 PM    WBCUA 36 04/02/2025 02:51 PM    RBCUA 1 04/02/2025 02:51 PM    BACTERIA None Seen 04/02/2025 02:51 PM    CLARITYU Clear 04/02/2025 02:51 PM    SPECGRAV 1.020 03/27/2025 02:58 PM    LEUKOCYTESUR LARGE 04/02/2025 02:51 PM    UROBILINOGEN 0.2 04/02/2025 02:51 PM    BILIRUBINUR Negative 04/02/2025 02:51 PM    BLOODU Negative 04/02/2025 02:51 PM    GLUCOSEU Negative 04/02/2025 02:51 PM    KETUA Negative 04/02/2025 02:51 PM     Urine Cultures:   Lab Results   Component Value Date/Time    LABURIN  04/02/2025 02:51 PM     <50,000 CFU/ml mixed skin/urogenital rachid. No further workup     Blood Cultures: No results found for: \"BC\"  No results found for: \"BLOODCULT2\"  Organism: No results found for: \"ORG\"    Electronically signed by Andrew Bullard MD on 4/5/2025 at 12:51 PM

## 2025-04-05 NOTE — DISCHARGE INSTRUCTIONS
Please call and make an appointment with your PCP within 1 week; If you do not have a PCP please make an appointment with the [facility] outpatient resident clinic by calling [phone]  Please call and make an appointment with Nephrology  Please take all your medications as prescribed including any new ones on discharge

## 2025-04-05 NOTE — PROGRESS NOTES
[facility] Note    Patient Active Problem List   Diagnosis    Other hyperlipidemia    Renovascular hypertension    Hypertension    TIA (transient ischemic attack)    Allergic rhinitis    Functional constipation    Decreased diffusion capacity of lung    GERD (gastroesophageal reflux disease)    Hypothyroidism due to Hashimoto's thyroiditis    Hallux rigidus    MGUS (monoclonal gammopathy of unknown significance)    Polymyalgia rheumatica syndrome    Postmenopausal osteoporosis    Primary osteoarthritis involving multiple joints    Primary osteoarthritis of right knee    Stage 3a chronic kidney disease (HCC)    Vitamin D deficiency    CVD (cerebrovascular disease)    Anxiety    Paroxysmal atrial fibrillation (HCC)    Secondary hypercoagulable state    History of stroke    Dyspnea on exertion       Past Medical History:   has a past medical history of Anxiety, Hyperlipidemia, and Hypertension.    Past Social History:   reports that she quit smoking about 42 years ago. Her smoking use included cigarettes. She has never used smokeless tobacco. She reports current alcohol use. She reports that she does not use drugs.    Subjective:    No new neurologic symptoms  Planning for D/C today  Daughter at bedside    Review of Systems   Constitutional:  Positive for fatigue. Negative for activity change, appetite change, chills, fever and unexpected weight change.   HENT:  Negative for congestion and facial swelling.    Eyes:  Negative for photophobia, discharge and redness.   Respiratory:  Negative for cough, chest tightness and shortness of breath.    Cardiovascular:  Negative for chest pain, palpitations and leg swelling.   Gastrointestinal:  Negative for abdominal distention, abdominal pain, blood in stool, constipation, diarrhea, nausea and vomiting.   Endocrine: Negative for cold intolerance, heat intolerance and polyuria.   Genitourinary:  Negative for decreased urine volume, difficulty

## 2025-04-05 NOTE — CARE COORDINATION
Case Management Assessment  Initial Evaluation    Date/Time of Evaluation: 4/5/2025 12:52 PM  Assessment Completed by: DEANNA SHAFFER RN    If patient is discharged prior to next notation, then this note serves as note for discharge by case management.    Patient Name: Fatimah Hernández                   YOB: 1934  Diagnosis: Right bundle branch block [I45.10]  Hypomagnesemia [E83.42]  Hyponatremia [E87.1]  Dyspnea on exertion [R06.09]  Pyuria [R82.81]  Paraspinal mass [R22.2]                   Date / Time: 4/2/2025  2:05 PM    Patient Admission Status: Inpatient   Readmission Risk (Low < 19, Mod (19-27), High > 27): Readmission Risk Score: 15.2    Current PCP: Alexander Day MD  PCP verified by CM? Yes    Chart Reviewed: Yes      History Provided by: Medical Record  Patient Orientation: Alert and Oriented    Patient Cognition: Alert    Hospitalization in the last 30 days (Readmission):  No    If yes, Readmission Assessment in  Navigator will be completed.    Advance Directives:      Code Status: DNR-CC   Patient's Primary Decision Maker is: Legal Next of Kin    Primary Decision Maker: fabbyshahzad long - Child - [phone]    Primary Decision Maker: Richard Leal - Brother/Sister - [phone]    Secondary Decision Maker: jhonandres - Grandchild - [phone]    Discharge Planning:    Patient lives with: Children Type of Home: House  Primary Care Giver: Self  Patient Support Systems include: Children   Current Financial resources: Medicare  Current community resources: ECF/Home Care  Current services prior to admission: Home Care, Durable Medical Equipment            Current DME: Cane, Walker, Other (Comment) (wallk in shower, grab bars, reacher, life alert)            Type of Home Care services:  OT, PT, Nursing Services    ADLS  Prior functional level: Assistance with the following:, Shopping, Housework, Cooking, Mobility (Active with [facility])  Current functional level: Other (see  comment), Mobility, Assistance with the following:, Shopping, Housework, Cooking (Active with [facility])    PT AM-PAC: 20 /24  OT AM-PAC:   /24    Family can provide assistance at DC: Yes  Would you like Case Management to discuss the discharge plan with any other family members/significant others, and if so, who? Yes (lives with daughter)  Plans to Return to Present Housing: Yes  Other Identified Issues/Barriers to RETURNING to current housing: none   Potential Assistance needed at discharge: Home Care, Other (Comment) (Active with [facility])            Potential DME:    Patient expects to discharge to: House  Plan for transportation at discharge:      Financial    Payor: ARACELI FONSECA MEDICARE / Plan: R[place] MEDICARE / Product Type: *No Product type* /     Does insurance require precert for SNF: Yes    Potential assistance Purchasing Medications: No  Meds-to-Beds request: Yes      [facility] - [place] - [address] - P [phone] - F [phone] [address]  [place] [address]  Phone: [phone] Fax: [phone]      Notes:    Factors facilitating achievement of predicted outcomes: Family support and Has needed Durable Medical Equipment at home    Barriers to discharge: none identified     Additional Case Management Notes:     Pt from home, lives with daughter.    Active with [facility] which CM verified with Alyssa at [facility] in intake [phone].    Pt will discharge home with continued  services.      The Plan for Transition of Care is related to the following treatment goals of Right bundle branch block [I45.10]  Hypomagnesemia [E83.42]  Hyponatremia [E87.1]  Dyspnea on exertion [R06.09]  Pyuria [R82.81]  Paraspinal mass [R22.2]    IF APPLICABLE: The Patient and/or patient representative Fatimah and her family were provided with a choice of provider and agrees with the discharge plan. Freedom of choice list with basic dialogue that supports the patient's individualized

## 2025-04-05 NOTE — PROGRESS NOTES
Admit Date: 4/2/2025  Diet: ADULT DIET; Regular    CC: Dyspnea    Interval history:   Overnight, there were no acute events. Patient's vitals remained stable    Patient was seen this morning. I discussed the plan of the day with her in detail. All questions were addressed and answered to patient's satisfaction.   Patient denies fevers, chills, nausea, vomiting, chest pain, shortness of breath, diarrhea, constipation, dysuria, urinary frequency or urgency.     Plan:     -Monitor Na, currently 124  -Will await nephro recs    Assessment:   Fatimah Hernández is a 89 y.o. female with PMH of p. Afib on Eliquis, Paraspinal mass, Hypothyroidism who was admitted with dyspnea    Dyspnea on exertion.  Elevated troponin.  Coronary calcification on CT  - Patient denies chest pain  - Troponin 12 ?  28  - EKG shows sinus tachycardia rate 115, RBBB which appears to be new compared to prior EKGs  - Monitor on telemetry  - Cardiology consulted     Acute on chronic hyponatremia  - Na 126, baseline ~130-133  - 500 ml bolus IV NS administered in ED  - Check Urine Sodium, Urine Creatinine, Urine and serum osmolarity, TSH, cortisol and uric acid   - Monitor Na  - Nephrology consulted     Paraspinal mass  - Incidental 2 cm right paraspinal mass noted at the T10 level.  - MRI ordered     Hypothyroidism  - Continue home Synthroid  - Check TSH     Paroxysmal A-fib  - Continue home Eliquis  - Not currently on any rate control or antiarrhythmic medication    Code Status: DNR-CC   FEN: ADULT DIET; Regular   DVT PPX: []Lovenox, []Heparin, []Coumadin, []Eliquis, []Xarelto, []SCD  DISPO Pending: Na improvement    Andrew Bullard MD  4/5/2025,  12:54 PM    This note was likely completed using voice recognition technology and may contain unintended errors.     Objective:   Vitals:   T-max:  Patient Vitals for the past 8 hrs:   BP Temp Temp src Pulse Resp SpO2 Weight   04/05/25 0906 (!) 123/58 98.5 °F (36.9 °C) Oral 56 16 99 % --   04/05/25 0530  melatonin

## 2025-04-05 NOTE — DISCHARGE INSTR - COC
Continuity of Care Form    Patient Name: Fatimah Hernández   :  10/25/1934  MRN:  [id number]    Admit date:  2025  Discharge date:  2025    Code Status Order: DNR-CC   Advance Directives:     Admitting Physician:  Jenni Rodriguez DO  PCP: Alexander Day MD    Discharging Nurse: Dagoberto  Discharging Hospital Unit/Room#: C9N-2651/5915-01  Discharging Unit Phone Number: [phone]    Emergency Contact:   Extended Emergency Contact Information  Primary Emergency Contact: shahzad hernández  Home Phone: [phone]  Relation: Child  Secondary Emergency Contact: andres hernández  Home Phone: [phone]  Mobile Phone: [phone]  Relation: Grandchild    Past Surgical History:  Past Surgical History:   Procedure Laterality Date    BONY PELVIS SURGERY      repair fx pelvis    COLONOSCOPY N/A 2024    COLONOSCOPY WITH BIOPSY performed by Durga Mendes MD at [facility] ENDOSCOPY    HYSTERECTOMY (CERVIX STATUS UNKNOWN)          HYSTERECTOMY, TOTAL ABDOMINAL (CERVIX REMOVED)      PARTIAL HIP ARTHROPLASTY Left        Immunization History:   Immunization History   Administered Date(s) Administered    COVID-19, MODERNA BLUE border, Primary or Immunocompromised, (age 12y+), IM, 100 mcg/0.5mL 10/27/2021    Influenza, FLUAD, (age 65 y+), IM, Quadv, 0.5mL 2023    Influenza, FLUAD, (age 65 y+), IM, Trivalent PF, 0.5mL 2024    Influenza, FLUZONE High Dose (age 65 y+), IM, Quadv, 0.7mL 2020, 2021, 10/04/2022    Influenza, FLUZONE High Dose, (age 65 y+), IM, Trivalent PF, 0.5mL 10/03/2016, 10/13/2017, 2018, 2019    Pneumococcal, PCV-13, PREVNAR 13, (age 6w+), IM, 0.5mL 2019    Pneumococcal, PPSV23, PNEUMOVAX 23, (age 2y+), SC/IM, 0.5mL 03/15/2021    TDaP, ADACEL (age 10y-64y), BOOSTRIX (age 10y+), IM, 0.5mL 2021    Zoster Recombinant (Shingrix) 2019, 2019       Active Problems:  Patient Active Problem List   Diagnosis Code    Other hyperlipidemia E78.49     Renovascular hypertension I15.0    Hypertension I10    TIA (transient ischemic attack) G45.9    Allergic rhinitis J30.9    Functional constipation K59.04    Decreased diffusion capacity of lung R94.2    GERD (gastroesophageal reflux disease) K21.9    Hypothyroidism due to Hashimoto's thyroiditis E06.3    Hallux rigidus M20.20    MGUS (monoclonal gammopathy of unknown significance) D47.2    Polymyalgia rheumatica syndrome M35.3    Postmenopausal osteoporosis M81.0    Primary osteoarthritis involving multiple joints M15.0    Primary osteoarthritis of right knee M17.11    Stage 3a chronic kidney disease (HCC) N18.31    Vitamin D deficiency E55.9    CVD (cerebrovascular disease) I67.9    Anxiety F41.9    Paroxysmal atrial fibrillation (HCC) I48.0    Secondary hypercoagulable state D68.69    History of stroke Z86.73    Dyspnea on exertion R06.09       Isolation/Infection:   Isolation            No Isolation          Patient Infection Status    None to display              Nurse Assessment:  Last Vital Signs: BP (!) 123/58   Pulse 56   Temp 98.5 °F (36.9 °C) (Oral)   Resp 16   Ht 1.676 m (5' 6\")   Wt 79.2 kg (174 lb 9.6 oz)   SpO2 99%   BMI 28.18 kg/m²     Last documented pain score (0-10 scale): Pain Level: 3  Last Weight:   Wt Readings from Last 1 Encounters:   04/05/25 79.2 kg (174 lb 9.6 oz)     Mental Status:  oriented, alert, coherent, logical, and thought processes intact    IV Access:  - None    Nursing Mobility/ADLs:  Walking   Independent  Transfer  Independent  Bathing  Independent  Dressing  Independent  Toileting  Assisted  Feeding  Independent  Med Admin  Independent  Med Delivery   whole    Wound Care Documentation and Therapy:        Elimination:  Continence:   Bowel: Yes  Bladder: Yes  Urinary Catheter: None   Colostomy/Ileostomy/Ileal Conduit: No       Date of Last BM: 4/4/2025    Intake/Output Summary (Last 24 hours) at 4/5/2025 0921  Last data filed at 4/5/2025 0604  Gross per 24 hour   Intake

## 2025-04-05 NOTE — CARE COORDINATION
Case Management -  Discharge Note      Patient Name: Fatimah Hernández                   YOB: 1934  Room: [place] [address]            Readmission Risk (Low < 19, Mod (19-27), High > 27): Readmission Risk Score: 15.2    Current PCP: Alexander Day MD      (IMM) Important Message from Medicare:    Has pt received appropriate compliance notices before being discharged if required: yes  Compliance doc:  [] 2nd IMM; [] Code 44 [] Tam  Date Given: 4/4/2025  Given By: Mary Cardoso    PT AM-PAC: 20 /24  OT AM-PAC:   /24    Patient/patient representative has been educated on the benefits of Home Care as well as the possible risks of declining recommended services. Patient/patient representative has acknowledged the information provided and decided on the following discharge plan. Patient/ patient representative has been provided freedom of choice regarding service provider, supported by basic dialogue that supports the patient's individualized plan of care/goals.    [facility]  [address]  [address]  [place] [address]  Phone: [phone]   Fax: [phone]      [facility] agency notified of discharge:  [x] Yes [] No  [] NA  CM called [facility] and spoke to Alyssa in intake and pt is active with them. CM faxed to [facility] [phone]: demographics, HC orders, gilda AVS (she is aware of blood work ordered weekly). Aware of discharge today.    CM updated RN.     Family notified of discharge:  [x] Yes  [] No  [] NA  Patient to notify    Facility notified of discharge:  [] Yes  [] No  [x] NA    Pt is being discharged with Outpt IV Antibiotics  [] Yes [] No  [x] NA  If yes, make sure GILDA is faxed to [facility] agency, and meds are called in to pharmacy by RN from GILDA orders only.      Financial    Payor: ARACELI FONSECA MEDICARE / Plan: [place] MEDICARE / Product Type: *No Product type* /     Pharmacy:  Potential assistance Purchasing Medications:    Meds-to-Beds request: Yes      [facility] -  [place], [place] - [address] - P [phone] - F [phone] [address]  [place] [address]  Phone: [phone] Fax: [phone]      Notes:    Additional Case Management Notes: Patient discharged 4/5/2025 to home.  All discharge needs met per case management.    Veronica Ward RN, BSN  [phone]      Veronica Ward RN, BSN  [phone]

## 2025-04-05 NOTE — PROGRESS NOTES
Data- discharge order received, pt verbalized agreement to discharge, disposition to previous residence, no needs for HHC/DME.     Action- discharge instructions prepared and given to pt, pt verbalized understanding. Medication information packet given r/t NEW and/or CHANGED prescriptions emphasizing name/purpose/side effects, pt verbalized understanding. Discharge instruction summary: Diet- as tolerated, Activity- up as tolerated, Primary Care Physician as follows: Alexander Day -[phone] f/u appointment within a week of dc, immunizations reviewed and updated, prescription medications filled at pharmacy of choice. Inpatient surgical procedure precautions reviewed: 60 min of CHF Education reviewed. Pt/ Family has had a total of 60 minutes CHF education this admission encounter.     1. WEIGHT: Admit Weight - Scale: 79.5 kg (175 lb 4.8 oz) (04/03/25 0638)        Today  Weight - Scale: 79.2 kg (174 lb 9.6 oz) (04/05/25 0530)       2. O2 SAT.: SpO2: 99 % (04/05/25 0906)    Response- Pt belongings gathered, IV removed. Disposition is home (no HHC/DME needs), transported with belongings, taken to lobby via w/c w/ RN, no complications.

## 2025-04-06 LAB — T4 FREE SERPL-MCNC: 1.2 NG/DL (ref 0.9–1.8)

## 2025-04-07 ENCOUNTER — CARE COORDINATION (OUTPATIENT)
Dept: CASE MANAGEMENT | Age: 89
End: 2025-04-07

## 2025-04-07 DIAGNOSIS — R06.09 DYSPNEA ON EXERTION: Primary | ICD-10-CM

## 2025-04-07 PROCEDURE — 1111F DSCHRG MED/CURRENT MED MERGE: CPT | Performed by: INTERNAL MEDICINE

## 2025-04-07 NOTE — CARE COORDINATION
discharge instructions?: Yes  Do you have all of your prescriptions and are they filled?: Yes  Have you been contacted by a [facility] Pharmacist?: No  Have you scheduled your follow up appointment?: Yes  Post Acute Services: Home Health (Comment: [facility])  Do you feel like you have everything you need to keep you well at home?: Yes  Care Transitions Interventions       Follow Up Appointment:   Discussed follow up appointments. Patient has hospital follow up appointment scheduled  scheduling completed during this outreach.  She also plans to f/up with cardiology and nephrology.     Future Appointments         Provider Specialty Dept Phone    4/14/2025 9:40 AM Alexander Day MD Internal Medicine [phone]    4/30/2025 2:30 PM Mohan Love PA Nephrology [phone]    6/5/2025 3:30 PM Raquel Razo MD Cardiology [phone]    6/30/2025 1:40 PM Alexander Day MD Internal Medicine [phone]          Care Transition Nurse provided contact information.  Plan for follow-up call in 2-5 days based on severity of symptoms and risk factors.  Plan for next call: referrals-home care JESSICA?      Ivonne Malik RN

## 2025-04-08 NOTE — PROGRESS NOTES
Documentation Reviewer    PROVIDER RESPONSE TEXT:    This patient has secondary hypercoagulable state in a patient with atrial   fibrillation.    Query created by: Jannet Cooley on 4/8/2025 9:52 AM      QUERY TEXT:    Elevated cardiac troponin (cTc) levels are documented in the medical record in   Cardiology consult. Please clarify the cause:    The clinical indicators include:  --Troponin values this encounter: 23 ->27 ->28 (taken 4/2 between 7563-1454)  --H/P: \"Dyspnea on exertion. Elevated troponin. Coronary calcification on CT.   Pt denies chest pain\"  --H/P: \"EKG shows...RBBB which appears to be new\"  --4/3 Cardiology consult note: \"Elevated troponin...not indicative of ACS...pt   denies anginal symptoms\"  --risk factors include: electrolyte disturbance and suspected \"fluid   depletion\", PAF (cardiology note 4/4)  --treatment: serial labs, cardiology consult, ECHO, EKG, monitoring  Options provided:  -- Nonischemic myocardial injury r/t dehydration and electrolyte imbalance   with tachycardia.  -- Myocardial injury, non-ischemic (non-traumatic) related to other cause,   Please specify cause.  -- Type 2 myocardial infarction with CHRISTIE as an anginal equivalent aeb new   RBBB.  -- Other - I will add my own diagnosis  -- Disagree - Not applicable / Not valid  -- Disagree - Clinically unable to determine / Unknown  -- Refer to Clinical Documentation Reviewer    PROVIDER RESPONSE TEXT:    Nonischemic myocardial injury r/t dehydration and electrolyte imbalance with   tachycardia.    Query created by: Jannet Cooley on 4/8/2025 9:52 AM      Electronically signed by:  WILLIE STEVENS MD 4/8/2025 11:48 AM

## 2025-04-09 ENCOUNTER — CARE COORDINATION (OUTPATIENT)
Dept: CASE MANAGEMENT | Age: 89
End: 2025-04-09

## 2025-04-09 NOTE — CARE COORDINATION
Care Transitions Note    Follow Up Call     Patient: Fatimah Hernández              Patient : 10/25/1934   MRN: [id number]                             Reason for Admission: [facility] x 3 days -> CHRISTIE, acute on chronic hypoNa, paraspinal mass-incidental at T10 level, hypothyroidism, paroxysmal A Fib on Eliquis, leukocyturia, hypoMg, ?hypovolemia, anemia, recent knee replacement  -> home with [facility] JESSICA SN PT OT, AM-PAC 20, f/up Dr Will-nephrology 2 weeks  Discharge Date: 25         RURS: Readmission Risk Score: 15.2    Patient Current Location:  Home: [address] [place] [address]    CTN contacted patient to confirm [facility] contacted her to resume services. She confirms they are there right now. CTN will follow up after her PCP appt on .     Follow Up Appointment:     Future Appointments         Provider Specialty Dept Phone    2025 9:40 AM Alexander Day MD Internal Medicine [phone]    2025 2:30 PM Mohan Love PA Nephrology [phone]    2025 3:30 PM Raquel Razo MD Cardiology [phone]    2025 1:40 PM Alexander Day MD Internal Medicine [phone]          Care Transition Nurse provided contact information.  Plan for follow-up call in 6-10 days based on severity of symptoms and risk factors.  Plan for next call: follow-up appointment- PCP      Ivonne Malik RN

## 2025-04-14 ENCOUNTER — OFFICE VISIT (OUTPATIENT)
Dept: INTERNAL MEDICINE CLINIC | Age: 89
End: 2025-04-14

## 2025-04-14 VITALS
SYSTOLIC BLOOD PRESSURE: 128 MMHG | HEIGHT: 66 IN | WEIGHT: 178.2 LBS | BODY MASS INDEX: 28.64 KG/M2 | OXYGEN SATURATION: 98 % | HEART RATE: 54 BPM | DIASTOLIC BLOOD PRESSURE: 60 MMHG

## 2025-04-14 DIAGNOSIS — Z09 HOSPITAL DISCHARGE FOLLOW-UP: Primary | ICD-10-CM

## 2025-04-14 DIAGNOSIS — D64.9 NORMOCHROMIC ANEMIA: ICD-10-CM

## 2025-04-14 DIAGNOSIS — N18.31 STAGE 3A CHRONIC KIDNEY DISEASE (HCC): ICD-10-CM

## 2025-04-14 DIAGNOSIS — I10 HYPERTENSION, UNSPECIFIED TYPE: ICD-10-CM

## 2025-04-14 LAB
BASOPHILS # BLD: 0 K/UL (ref 0–0.2)
BASOPHILS NFR BLD: 0.7 %
DEPRECATED RDW RBC AUTO: 14.6 % (ref 12.4–15.4)
EOSINOPHIL # BLD: 0.2 K/UL (ref 0–0.6)
EOSINOPHIL NFR BLD: 2.6 %
HCT VFR BLD AUTO: 28.3 % (ref 36–48)
HGB BLD-MCNC: 9.8 G/DL (ref 12–16)
LYMPHOCYTES # BLD: 1 K/UL (ref 1–5.1)
LYMPHOCYTES NFR BLD: 17.4 %
MCH RBC QN AUTO: 34.4 PG (ref 26–34)
MCHC RBC AUTO-ENTMCNC: 34.7 G/DL (ref 31–36)
MCV RBC AUTO: 99.1 FL (ref 80–100)
MONOCYTES # BLD: 0.5 K/UL (ref 0–1.3)
MONOCYTES NFR BLD: 8.3 %
NEUTROPHILS # BLD: 4.3 K/UL (ref 1.7–7.7)
NEUTROPHILS NFR BLD: 71 %
PLATELET # BLD AUTO: 346 K/UL (ref 135–450)
PMV BLD AUTO: 8.1 FL (ref 5–10.5)
RBC # BLD AUTO: 2.86 M/UL (ref 4–5.2)
WBC # BLD AUTO: 6 K/UL (ref 4–11)

## 2025-04-14 RX ORDER — METOPROLOL SUCCINATE 25 MG/1
12.5 TABLET, EXTENDED RELEASE ORAL
Qty: 45 TABLET | Refills: 3 | Status: SHIPPED | OUTPATIENT
Start: 2025-04-14

## 2025-04-14 NOTE — PROGRESS NOTES
Post-Discharge Transitional Care Follow Up      Fatimah Hernández   YOB: 1934    Date of Office Visit:  4/14/2025  Date of Hospital Admission: 4/2/25  Date of Hospital Discharge: 4/5/25  Readmission Risk Score (high >=14%. Medium >=10%):Readmission Risk Score: 15.2      Care management risk score Rising risk (score 2-5) and Complex Care (Scores >=6): No Risk Score On File     Non face to face  following discharge, date last encounter closed (first attempt may have been earlier): 04/07/2025     Call initiated 2 business days of discharge: Yes     Hospital discharge follow-up  -     MS DISCHARGE MEDS RECONCILED W/ CURRENT OUTPATIENT MED LIST  Normochromic anemia  -     CBC with Auto Differential; Future  Hypertension, unspecified type  -     CBC with Auto Differential; Future  -     metoprolol succinate (TOPROL XL) 25 MG extended release tablet; Take 0.5 tablets by mouth nightly, Disp-45 tablet, R-3Normal  Stage 3a chronic kidney disease (HCC)    Medical Decision Making: moderate complexity  Return in 3 months (on 7/14/2025).         Assessment & Plan  1. Hyponatremia.  - Sodium levels have fluctuated, with a recent drop to 124 during hospitalization and a discharge level of 127, still lower than her usual range of 129-132.  - Given fluids and sodium tablets, leading to shortness of breath and ankle swelling; advised to reduce salt tablet intake to 1 or 2 tablets daily.  - Blood count will be conducted to ensure anemia is not worsening; chemistry panel ordered for nephrologist to assess sodium levels based on current intake.  - Continue taking furosemide for now.    2. Atrial Fibrillation.  - Currently on Eliquis and metoprolol 25 mg once a day.  - Heart rate noted to be low, around 50-55 bpm; metoprolol dosage will be reduced to 12.5 mg to aim for a heart rate of approximately 60 bpm.  - Monitor for symptoms such as lightheadedness or dizziness.  - Review of medications to ensure no excessive sodium

## 2025-04-15 ENCOUNTER — OFFICE VISIT (OUTPATIENT)
Dept: CARDIOLOGY CLINIC | Age: 89
End: 2025-04-15
Payer: MEDICARE

## 2025-04-15 ENCOUNTER — RESULTS FOLLOW-UP (OUTPATIENT)
Dept: INTERNAL MEDICINE CLINIC | Age: 89
End: 2025-04-15

## 2025-04-15 VITALS
HEART RATE: 67 BPM | BODY MASS INDEX: 28.93 KG/M2 | OXYGEN SATURATION: 93 % | SYSTOLIC BLOOD PRESSURE: 140 MMHG | WEIGHT: 180 LBS | HEIGHT: 66 IN | DIASTOLIC BLOOD PRESSURE: 78 MMHG

## 2025-04-15 DIAGNOSIS — I48.0 PAROXYSMAL ATRIAL FIBRILLATION (HCC): Primary | ICD-10-CM

## 2025-04-15 DIAGNOSIS — I10 HYPERTENSION, UNSPECIFIED TYPE: ICD-10-CM

## 2025-04-15 DIAGNOSIS — E78.49 OTHER HYPERLIPIDEMIA: ICD-10-CM

## 2025-04-15 PROCEDURE — 1123F ACP DISCUSS/DSCN MKR DOCD: CPT | Performed by: INTERNAL MEDICINE

## 2025-04-15 PROCEDURE — G2211 COMPLEX E/M VISIT ADD ON: HCPCS | Performed by: INTERNAL MEDICINE

## 2025-04-15 PROCEDURE — 99214 OFFICE O/P EST MOD 30 MIN: CPT | Performed by: INTERNAL MEDICINE

## 2025-04-15 PROCEDURE — 1159F MED LIST DOCD IN RCRD: CPT | Performed by: INTERNAL MEDICINE

## 2025-04-15 PROCEDURE — 93000 ELECTROCARDIOGRAM COMPLETE: CPT | Performed by: INTERNAL MEDICINE

## 2025-04-15 NOTE — PROGRESS NOTES
[facility]   Cardiac Evaluation      Patient: Fatimah Hernández  YOB: 1934         Chief Complaint   Patient presents with    Atrial Fibrillation    Hypertension        Referring provider: Alexander Day MD    History of Present Illness:    Ms Hernández is seen for follow up to recent hospitalization.  She was initially seen as a self referral after hospitalization 10/3-10/4/22. Fatimah was hospitalized with expressive aphasia and hand numbness for 2 hours prior to hospital arrival. Her symptoms resolved. Carotid doppler revealed <50% stenosis bilaterally. She had an echo with EF 55%, moderate AR, mild MR. She was started on ASA and Lipitor 80mg daily. She had previously been taken off Simvastatin by her PMD because of her age that she took for 40+ years. She has a strong family history of early heart disease in her mother and aunts.       Ms Hernández was hospitalized 5/2/23 with aphasia. She had undergone fractured pelvic surgery on 4/7/23 and was in a nursing facility for rehab. MRI of the brain showed small-moderate sized acute infarct along the left central sulcus and subcortical region. She was taking Plavix and Lipitor 80mg daily. During this hospitalization she was started on sodium chloride tabs. Prior to this event Ms Hernández's 61 yo son suddenly passed away from angioedema.     She was hospitalized 7/21/23 with TIA. Hospital notes say she had atrial fibrillation on telemetry. Plavix and asa were changed to Eliquis 5mg BID. She was discharged with 30 day cardiac monitor. Echo was done that revealed EF 55-60%. All the EKGs from the hospital show SR.     She was seen 12/24 for clearance prior to knee replacement surgery. She had a normal stress test 1/22/25. Fatimah underwent knee replacement surgery on 3/12/25. On 4/2/25 her HHN called the office stating Fatimah was tachycardic and SOB when being active. She was sent to the ER for evaluation. She was admitted to the hospital

## 2025-04-15 NOTE — RESULT ENCOUNTER NOTE
Please let the patient know that results were acceptable reading is back to, also let the patient know I looked at her sodium reading and it did go up to 132

## 2025-04-16 ENCOUNTER — CARE COORDINATION (OUTPATIENT)
Dept: CARE COORDINATION | Age: 89
End: 2025-04-16

## 2025-04-16 NOTE — CARE COORDINATION
Blood and UA sent to lab   Care Transitions Note    Follow Up Call     Attempted to reach patient for transitions of care follow up.  Unable to reach patient.      Outreach Attempts:   HIPAA compliant voicemail left for patient.     Follow Up Appointment:   Future Appointments         Provider Specialty Dept Phone    4/30/2025 2:30 PM Mohan Love PA Nephrology [phone]    7/14/2025 9:40 AM Alexander Day MD Internal Medicine [phone]            Plan for follow-up call in 2-5 days based on severity of symptoms and risk factors. Plan for next call: symptom management-   self management-     Yessy Roche

## 2025-04-17 ENCOUNTER — TELEPHONE (OUTPATIENT)
Dept: INTERNAL MEDICINE CLINIC | Age: 89
End: 2025-04-17

## 2025-04-17 RX ORDER — ATORVASTATIN CALCIUM 80 MG/1
80 TABLET, FILM COATED ORAL DAILY
Qty: 90 TABLET | Refills: 0 | Status: SHIPPED | OUTPATIENT
Start: 2025-04-17 | End: 2025-07-16

## 2025-04-17 NOTE — TELEPHONE ENCOUNTER
Pharmacy calling requesting refill of atorvastatin (LIPITOR) 80 MG tablet     Last written 1/20/25  Last OV 4/14/25  Next OV 7/14/25    Please send to [facility] [address]

## 2025-04-18 ENCOUNTER — CARE COORDINATION (OUTPATIENT)
Dept: CASE MANAGEMENT | Age: 89
End: 2025-04-18

## 2025-04-18 NOTE — CARE COORDINATION
Care Transitions Note    Follow Up Call     Attempted to reach patient for transitions of care follow up.  Unable to reach patient.      Outreach Attempts:   HIPAA compliant voicemail left for patient.     Care Summary Note: 2nd unsuccessful attempt to reach for Care Transition follow up call. HIPAA compliant message left requesting call back.CT program closed per protocol, no further outreach scheduled.       Follow Up Appointment:   Future Appointments         Provider Specialty Dept Phone    4/30/2025 2:30 PM Mohan Love PA Nephrology [phone]    7/14/2025 9:40 AM Alexander Day MD Internal Medicine [phone]    8/20/2025 1:00 PM Raquel Razo MD Cardiology [phone]              Arabella Ivy RN

## 2025-05-27 ENCOUNTER — TELEPHONE (OUTPATIENT)
Dept: INTERNAL MEDICINE CLINIC | Age: 89
End: 2025-05-27

## 2025-05-27 DIAGNOSIS — F41.9 ANXIETY: ICD-10-CM

## 2025-05-27 DIAGNOSIS — E55.9 VITAMIN D DEFICIENCY: ICD-10-CM

## 2025-05-27 RX ORDER — ERGOCALCIFEROL 1.25 MG/1
CAPSULE, LIQUID FILLED ORAL
Qty: 12 CAPSULE | Refills: 1 | Status: SHIPPED | OUTPATIENT
Start: 2025-05-27

## 2025-05-27 RX ORDER — CITALOPRAM HYDROBROMIDE 20 MG/1
20 TABLET ORAL DAILY
Qty: 90 TABLET | Refills: 1 | Status: SHIPPED | OUTPATIENT
Start: 2025-05-27

## 2025-05-27 NOTE — TELEPHONE ENCOUNTER
Pt  calling requesting refill of Vitamin D (2/19/25) and Citalopram (11/24/24)    Last written see above  Last OV 4/14/25  Next OV 7/4/25   Last recommended OV NA     Please send to [facility]

## 2025-06-20 DIAGNOSIS — E06.3 HYPOTHYROIDISM DUE TO HASHIMOTO'S THYROIDITIS: ICD-10-CM

## 2025-06-20 RX ORDER — APIXABAN 5 MG/1
5 TABLET, FILM COATED ORAL 2 TIMES DAILY
Qty: 180 TABLET | Refills: 3 | Status: SHIPPED | OUTPATIENT
Start: 2025-06-20

## 2025-06-20 RX ORDER — LEVOTHYROXINE SODIUM 50 UG/1
50 TABLET ORAL DAILY
Qty: 90 TABLET | Refills: 0 | Status: SHIPPED | OUTPATIENT
Start: 2025-06-20

## 2025-06-20 NOTE — TELEPHONE ENCOUNTER
Levothyroxine 50 mcg    Last OV: 4/14/2025  Next OV: 7/14/2025    Next appointment due:7/14/25    Last fill:12/19/2024  Refills: 1

## 2025-06-26 ENCOUNTER — OFFICE VISIT (OUTPATIENT)
Dept: INTERNAL MEDICINE CLINIC | Age: 89
End: 2025-06-26
Payer: MEDICARE

## 2025-06-26 VITALS
HEART RATE: 60 BPM | SYSTOLIC BLOOD PRESSURE: 134 MMHG | WEIGHT: 175 LBS | DIASTOLIC BLOOD PRESSURE: 64 MMHG | BODY MASS INDEX: 28.12 KG/M2 | OXYGEN SATURATION: 98 % | HEIGHT: 66 IN

## 2025-06-26 DIAGNOSIS — E87.1 HYPONATREMIA: ICD-10-CM

## 2025-06-26 DIAGNOSIS — Z86.73 HISTORY OF STROKE: ICD-10-CM

## 2025-06-26 DIAGNOSIS — I10 HYPERTENSION, UNSPECIFIED TYPE: Primary | ICD-10-CM

## 2025-06-26 DIAGNOSIS — M35.3 POLYMYALGIA RHEUMATICA SYNDROME: ICD-10-CM

## 2025-06-26 DIAGNOSIS — M17.10 KNEE ARTHROPATHY: ICD-10-CM

## 2025-06-26 DIAGNOSIS — E06.3 HYPOTHYROIDISM DUE TO HASHIMOTO'S THYROIDITIS: ICD-10-CM

## 2025-06-26 DIAGNOSIS — I48.0 PAROXYSMAL ATRIAL FIBRILLATION (HCC): ICD-10-CM

## 2025-06-26 DIAGNOSIS — E55.9 VITAMIN D DEFICIENCY: ICD-10-CM

## 2025-06-26 DIAGNOSIS — N18.31 STAGE 3A CHRONIC KIDNEY DISEASE (HCC): ICD-10-CM

## 2025-06-26 LAB
ANION GAP SERPL CALCULATED.3IONS-SCNC: 13 MMOL/L (ref 3–16)
BASOPHILS # BLD: 0 K/UL (ref 0–0.2)
BASOPHILS NFR BLD: 0.7 %
BUN SERPL-MCNC: 20 MG/DL (ref 7–20)
CALCIUM SERPL-MCNC: 9.4 MG/DL (ref 8.3–10.6)
CHLORIDE SERPL-SCNC: 95 MMOL/L (ref 99–110)
CO2 SERPL-SCNC: 26 MMOL/L (ref 21–32)
CREAT SERPL-MCNC: 0.8 MG/DL (ref 0.6–1.2)
CRP SERPL-MCNC: 9.5 MG/L (ref 0–5.1)
DEPRECATED RDW RBC AUTO: 13.8 % (ref 12.4–15.4)
EOSINOPHIL # BLD: 0.2 K/UL (ref 0–0.6)
EOSINOPHIL NFR BLD: 3.1 %
ERYTHROCYTE [SEDIMENTATION RATE] IN BLOOD BY WESTERGREN METHOD: 60 MM/HR (ref 0–30)
GFR SERPLBLD CREATININE-BSD FMLA CKD-EPI: 70 ML/MIN/{1.73_M2}
GLUCOSE SERPL-MCNC: 91 MG/DL (ref 70–99)
HCT VFR BLD AUTO: 31.2 % (ref 36–48)
HGB BLD-MCNC: 10.9 G/DL (ref 12–16)
LYMPHOCYTES # BLD: 1.3 K/UL (ref 1–5.1)
LYMPHOCYTES NFR BLD: 19.1 %
MCH RBC QN AUTO: 33.3 PG (ref 26–34)
MCHC RBC AUTO-ENTMCNC: 34.9 G/DL (ref 31–36)
MCV RBC AUTO: 95.5 FL (ref 80–100)
MONOCYTES # BLD: 0.7 K/UL (ref 0–1.3)
MONOCYTES NFR BLD: 9.7 %
NEUTROPHILS # BLD: 4.6 K/UL (ref 1.7–7.7)
NEUTROPHILS NFR BLD: 67.4 %
PLATELET # BLD AUTO: 279 K/UL (ref 135–450)
PMV BLD AUTO: 8.2 FL (ref 5–10.5)
POTASSIUM SERPL-SCNC: 4.8 MMOL/L (ref 3.5–5.1)
RBC # BLD AUTO: 3.27 M/UL (ref 4–5.2)
SODIUM SERPL-SCNC: 134 MMOL/L (ref 136–145)
WBC # BLD AUTO: 6.9 K/UL (ref 4–11)

## 2025-06-26 PROCEDURE — 93000 ELECTROCARDIOGRAM COMPLETE: CPT | Performed by: INTERNAL MEDICINE

## 2025-06-26 PROCEDURE — 1160F RVW MEDS BY RX/DR IN RCRD: CPT | Performed by: INTERNAL MEDICINE

## 2025-06-26 PROCEDURE — 1123F ACP DISCUSS/DSCN MKR DOCD: CPT | Performed by: INTERNAL MEDICINE

## 2025-06-26 PROCEDURE — 1159F MED LIST DOCD IN RCRD: CPT | Performed by: INTERNAL MEDICINE

## 2025-06-26 PROCEDURE — 99214 OFFICE O/P EST MOD 30 MIN: CPT | Performed by: INTERNAL MEDICINE

## 2025-06-26 ASSESSMENT — ENCOUNTER SYMPTOMS
ABDOMINAL PAIN: 0
COLOR CHANGE: 0
SINUS PAIN: 0
BLOOD IN STOOL: 0
CHEST TIGHTNESS: 0
CONSTIPATION: 0
COUGH: 0
SHORTNESS OF BREATH: 0
WHEEZING: 0

## 2025-06-26 NOTE — PROGRESS NOTES
Fatimah Hernández (:  10/25/1934) is a 89 y.o. female,Established patient, here for evaluation of the following chief complaint(s):  Pre-op Exam (RIGHT TOTAL KNEE ARTHROPLASTY 25)      Assessment & Plan   ASSESSMENT/PLAN:  1. Hypertension, unspecified type  2. Stage 3a chronic kidney disease (HCC)  3. Hypothyroidism due to Hashimoto's thyroiditis  4. Vitamin D deficiency  5. Paroxysmal atrial fibrillation (HCC)  -     EKG 12 Lead; Future  6. History of stroke  7. Hyponatremia  -     Basic Metabolic Panel; Future  -     CBC with Auto Differential; Future  8. Knee arthropathy  9. Polymyalgia rheumatica syndrome  -     Sedimentation Rate; Future  -     C-Reactive Protein; Future  Patient here today for her presurgical risk assessment patient does not have any cardiovascular pulmonary symptoms.    The patient is 89 years old and she is high risk based on her age but she does not have any contraindication to her surgery at this point going to do her EKG and blood work and decide if she is appropriate for surgery    Patient has been having some inflammation of her hands and at this stage we will going to go ahead and check her markers since she does have history of pulmonology rheumatica should she need any steroids we will have to consult with her orthopedic surgeon and try to coordinate treatment if needed    The patient blood pressure is stable we will continue same and monitor clinically.    Patient does have history of hyponatremia in the past she was on salt tablets she was taken off it by her cardiologist nonetheless I am concerned that she may be dropping again so we can repeat that and decide if we need to start her back on it    Addendum patient labs reviewed patient is cleared for surgery  Assessment & Plan      Return in about 6 weeks (around 2025), or if symptoms worsen or fail to improve, for As scheduled.         Subjective   SUBJECTIVE/OBJECTIVE:  History of Present

## 2025-06-27 ENCOUNTER — CLINICAL SUPPORT (OUTPATIENT)
Dept: INTERNAL MEDICINE CLINIC | Age: 89
End: 2025-06-27

## 2025-06-27 ENCOUNTER — RESULTS FOLLOW-UP (OUTPATIENT)
Dept: INTERNAL MEDICINE CLINIC | Age: 89
End: 2025-06-27

## 2025-06-27 NOTE — TELEPHONE ENCOUNTER
Tricia calling back from Dr. David Saini office.    She did get the VM but needs to know more info on the steriod. What is the medication, the dose and duration? She is worried that with so close to the surgery date, the steroid may affect her numbers. She is needing more info, thanks.     Please call her at [phone].

## 2025-06-27 NOTE — TELEPHONE ENCOUNTER
Left message for patient to call back. Called [facility] [phone] to inquire if it would be acceptable for  to prescribe a low dose oral steroid to treat patients polymyalgia considering she has upcoming surgery with [facility], spoke to Shwetha who wanted to send a message to  staff but writer asked her to transfer call to their staff for answer since patient is here in office now. Shwetha attempted to reach his staff but noone was available, she sent message with callback info.

## 2025-06-27 NOTE — RESULT ENCOUNTER NOTE
Please let the patient know that results were acceptable for the most part but her inflammatory markers are elevated as we expected given that her symptoms are flaring up again.    [facility] inform [facility] that we are interested in starting the patient on a low-dose steroid to try and control her polymyalgia and see if they are agreeable with that and if there is any issue with her having the surgery and using the oral steroids
1 person assist/verbal cues

## 2025-07-01 ENCOUNTER — TELEPHONE (OUTPATIENT)
Dept: CARDIOLOGY CLINIC | Age: 89
End: 2025-07-01

## 2025-07-01 NOTE — TELEPHONE ENCOUNTER
CARDIAC CLEARANCE     What type of procedure are you having?  Right Knee replacement  Which physician is performing your procedure? Dr Saini    When is your procedure scheduled for?  7/9/24  Where are you having this [facility]    Are you taking Blood Thinners? Eliquis   If so what? (Name/dose/frequesncy)     Does the surgeon want you to stop your blood thinner?  If so for how long? 2 days prior    Phone Number and Contact Name for Physicians office: [phone]     Fax number to send information: [phone]

## 2025-07-01 NOTE — TELEPHONE ENCOUNTER
Ms Hernández is cleared for another knee surgery.  She can stop the eliquis 2 days prior to the procedure.

## 2025-07-03 ENCOUNTER — TELEPHONE (OUTPATIENT)
Dept: INTERNAL MEDICINE CLINIC | Age: 89
End: 2025-07-03

## 2025-07-03 NOTE — TELEPHONE ENCOUNTER
Juli from [facility], requesting EKG tracing faxed to [phone]. EKG tracing from 6/27/25 has been faxed to number provided.

## 2025-07-14 NOTE — PROGRESS NOTES
[facility]   Cardiac Evaluation      Patient: Fatimah Hernández  YOB: 1934         Chief Complaint   Patient presents with    Hypertension    Hyperlipidemia    Atrial Fibrillation        Referring provider: Alexander Day MD    History of Present Illness:    Ms Hernández is seen for follow up to recent hospitalization.  She was initially seen as a self referral after hospitalization 10/3-10/4/22. Fatimah was hospitalized with expressive aphasia and hand numbness for 2 hours prior to hospital arrival. Her symptoms resolved. Carotid doppler revealed <50% stenosis bilaterally. She had an echo with EF 55%, moderate AR, mild MR. She was started on ASA and Lipitor 80mg daily. She had previously been taken off Simvastatin by her PMD because of her age that she took for 40+ years. She has a strong family history of early heart disease in her mother and aunts.       Ms Hernández was hospitalized 5/2/23 with aphasia. She had undergone fractured pelvic surgery on 4/7/23 and was in a nursing facility for rehab. MRI of the brain showed small-moderate sized acute infarct along the left central sulcus and subcortical region. She was taking Plavix and Lipitor 80mg daily. During this hospitalization she was started on sodium chloride tabs. Prior to this event Ms Hernández's 59 yo son suddenly passed away from angioedema.     She was hospitalized 7/21/23 with TIA. Hospital notes say she had atrial fibrillation on telemetry. Plavix and asa were changed to Eliquis 5mg BID. She was discharged with 30 day cardiac monitor. Echo was done that revealed EF 55-60%. All the EKGs from the hospital show SR.     She was seen 12/24 for clearance prior to knee replacement surgery. She had a normal stress test 1/22/25. Fatimah underwent knee replacement surgery on 3/12/25. On 4/2/25 her HHN called the office stating Fatimah was tachycardic and SOB when being active. She was sent to the ER for evaluation. She was admitted to

## 2025-07-15 ENCOUNTER — OFFICE VISIT (OUTPATIENT)
Dept: CARDIOLOGY CLINIC | Age: 89
End: 2025-07-15
Payer: MEDICARE

## 2025-07-15 VITALS
DIASTOLIC BLOOD PRESSURE: 62 MMHG | SYSTOLIC BLOOD PRESSURE: 132 MMHG | BODY MASS INDEX: 27.97 KG/M2 | OXYGEN SATURATION: 97 % | HEART RATE: 55 BPM | WEIGHT: 174 LBS | HEIGHT: 66 IN

## 2025-07-15 DIAGNOSIS — I48.0 PAROXYSMAL ATRIAL FIBRILLATION (HCC): Primary | ICD-10-CM

## 2025-07-15 DIAGNOSIS — Z01.810 PRE-OPERATIVE CARDIOVASCULAR EXAMINATION: ICD-10-CM

## 2025-07-15 DIAGNOSIS — I10 HYPERTENSION, UNSPECIFIED TYPE: ICD-10-CM

## 2025-07-15 DIAGNOSIS — E78.49 OTHER HYPERLIPIDEMIA: ICD-10-CM

## 2025-07-15 DIAGNOSIS — G45.9 TIA (TRANSIENT ISCHEMIC ATTACK): ICD-10-CM

## 2025-07-15 PROCEDURE — 1123F ACP DISCUSS/DSCN MKR DOCD: CPT | Performed by: INTERNAL MEDICINE

## 2025-07-15 PROCEDURE — 99214 OFFICE O/P EST MOD 30 MIN: CPT | Performed by: INTERNAL MEDICINE

## 2025-07-15 PROCEDURE — 93000 ELECTROCARDIOGRAM COMPLETE: CPT | Performed by: INTERNAL MEDICINE

## 2025-07-15 PROCEDURE — 1159F MED LIST DOCD IN RCRD: CPT | Performed by: INTERNAL MEDICINE

## 2025-07-15 PROCEDURE — G2211 COMPLEX E/M VISIT ADD ON: HCPCS | Performed by: INTERNAL MEDICINE

## 2025-07-15 RX ORDER — FERROUS SULFATE 325(65) MG
325 TABLET, DELAYED RELEASE (ENTERIC COATED) ORAL EVERY 24 HOURS
COMMUNITY
Start: 2025-07-07 | End: 2025-08-06

## 2025-07-15 RX ORDER — ATORVASTATIN CALCIUM 80 MG/1
80 TABLET, FILM COATED ORAL DAILY
Qty: 90 TABLET | Refills: 0
Start: 2025-07-15 | End: 2025-07-16 | Stop reason: SDUPTHER

## 2025-07-15 RX ORDER — APIXABAN 2.5 MG/1
2.5 TABLET, FILM COATED ORAL 2 TIMES DAILY
COMMUNITY
Start: 2025-07-07

## 2025-07-15 RX ORDER — METOPROLOL TARTRATE 25 MG/1
12.5 TABLET, FILM COATED ORAL 2 TIMES DAILY
COMMUNITY
Start: 2025-07-07 | End: 2025-07-15

## 2025-07-15 RX ORDER — ASPIRIN 81 MG/1
81 TABLET ORAL DAILY
COMMUNITY

## 2025-07-15 RX ORDER — METOPROLOL SUCCINATE 25 MG/1
25 TABLET, EXTENDED RELEASE ORAL
Qty: 45 TABLET | Refills: 3
Start: 2025-07-15 | End: 2025-07-16 | Stop reason: SDUPTHER

## 2025-07-16 ENCOUNTER — TELEPHONE (OUTPATIENT)
Dept: INTERNAL MEDICINE CLINIC | Age: 89
End: 2025-07-16

## 2025-07-16 ENCOUNTER — TELEPHONE (OUTPATIENT)
Dept: CARDIOLOGY CLINIC | Age: 89
End: 2025-07-16

## 2025-07-16 RX ORDER — ATORVASTATIN CALCIUM 80 MG/1
80 TABLET, FILM COATED ORAL DAILY
Qty: 90 TABLET | Refills: 0 | Status: SHIPPED | OUTPATIENT
Start: 2025-07-16 | End: 2025-10-14

## 2025-07-16 RX ORDER — METOPROLOL SUCCINATE 25 MG/1
25 TABLET, EXTENDED RELEASE ORAL
Qty: 45 TABLET | Refills: 3 | Status: SHIPPED | OUTPATIENT
Start: 2025-07-16

## 2025-07-16 NOTE — TELEPHONE ENCOUNTER
Medication Refill    Medication needing refilled:  atorvastatin (LIPITOR)   Dosage of the medication:  80 MG tablet   How are you taking this medication (QD, BID, TID, QID, PRN):    30 or 90 day supply called in:    When will you run out of your medication:    Which Pharmacy are we sending the medication to?:  [facility] - [facility] - [address] - P [phone] - F [phone] [address] [place] [address]  Phone: [phone]  Fax: [phone]

## 2025-07-16 NOTE — TELEPHONE ENCOUNTER
Maddy calling from [facility] in [place] about the patients prescription of atorvastatin (LIPITOR) 80 MG tablet , a refill was requested from the patient . I looked at the patients medication list and it says there was a refill on 07/15/25 with a quantity of 90 by [facility] as well as another prescription but I can not see where it was sent. Pharmacy stated they called us because we sent in the last prescription , she needs to know if we are the ones who are going to fill the prescription or if she needs to contact another provider . Please advise

## 2025-07-16 NOTE — TELEPHONE ENCOUNTER
Returned call to Maddy at [facility] and advised atorvastatin and Metoprolol were ordered today by Dr.Deborah Razo and gave #[phone] to call. Also messaged Rosalia Salas RN for  notifying her of patients MRN# and meds need sent to [facility]-she responded stating she would send them now.

## 2025-07-25 ENCOUNTER — HOSPITAL ENCOUNTER (OUTPATIENT)
Age: 89
Discharge: HOME | End: 2025-07-27
Payer: MEDICARE

## 2025-07-25 VITALS — HEIGHT: 66 IN | BODY MASS INDEX: 27.64 KG/M2 | WEIGHT: 172 LBS

## 2025-07-25 DIAGNOSIS — Z01.810 PRE-OPERATIVE CARDIOVASCULAR EXAMINATION: ICD-10-CM

## 2025-07-25 LAB
ECHO BSA: 1.91 M2
NUC STRESS EJECTION FRACTION: 67 %
NUC STRESS LV EDV: 112 ML (ref 56–104)
NUC STRESS LV ESV: 37 ML (ref 19–49)
NUC STRESS LV MASS: 143 G
STRESS BASELINE DIAS BP: 77 MMHG
STRESS BASELINE HR: 54 BPM
STRESS BASELINE SYS BP: 150 MMHG
STRESS ESTIMATED WORKLOAD: 1 METS
STRESS EXERCISE DUR MIN: 4 MIN
STRESS EXERCISE DUR SEC: 0 SEC
STRESS O2 SAT PEAK: 99 %
STRESS O2 SAT REST: 98 %
STRESS PEAK DIAS BP: 59 MMHG
STRESS PEAK SYS BP: 128 MMHG
STRESS PERCENT HR ACHIEVED: 49 %
STRESS POST PEAK HR: 64 BPM
STRESS RATE PRESSURE PRODUCT: 8192 BPM*MMHG
STRESS TARGET HR: 130 BPM
TID: 1.06

## 2025-07-25 PROCEDURE — 93018 CV STRESS TEST I&R ONLY: CPT | Performed by: INTERNAL MEDICINE

## 2025-07-25 PROCEDURE — A9502 TC99M TETROFOSMIN: HCPCS | Performed by: INTERNAL MEDICINE

## 2025-07-25 PROCEDURE — 3430000000 HC RX DIAGNOSTIC RADIOPHARMACEUTICAL: Performed by: INTERNAL MEDICINE

## 2025-07-25 PROCEDURE — 93017 CV STRESS TEST TRACING ONLY: CPT

## 2025-07-25 PROCEDURE — 6360000002 HC RX W HCPCS: Performed by: INTERNAL MEDICINE

## 2025-07-25 PROCEDURE — 78452 HT MUSCLE IMAGE SPECT MULT: CPT

## 2025-07-25 PROCEDURE — 78452 HT MUSCLE IMAGE SPECT MULT: CPT | Performed by: INTERNAL MEDICINE

## 2025-07-25 PROCEDURE — 93016 CV STRESS TEST SUPVJ ONLY: CPT | Performed by: INTERNAL MEDICINE

## 2025-07-25 RX ORDER — REGADENOSON 0.08 MG/ML
0.4 INJECTION, SOLUTION INTRAVENOUS
Status: COMPLETED | OUTPATIENT
Start: 2025-07-25 | End: 2025-07-25

## 2025-07-25 RX ADMIN — TETROFOSMIN 10.5 MILLICURIE: 1.38 INJECTION, POWDER, LYOPHILIZED, FOR SOLUTION INTRAVENOUS at 12:55

## 2025-07-25 RX ADMIN — TETROFOSMIN 31.9 MILLICURIE: 1.38 INJECTION, POWDER, LYOPHILIZED, FOR SOLUTION INTRAVENOUS at 13:50

## 2025-07-25 RX ADMIN — REGADENOSON 0.4 MG: 0.08 INJECTION, SOLUTION INTRAVENOUS at 13:39

## 2025-08-21 RX ORDER — LOSARTAN POTASSIUM 50 MG/1
50 TABLET ORAL DAILY
Qty: 90 TABLET | Refills: 1 | Status: SHIPPED | OUTPATIENT
Start: 2025-08-21

## (undated) DEVICE — SOLUTION IV IRRIG WATER 500ML POUR BRL ST 2F7113

## (undated) DEVICE — VALVE SUCTION AIR H2O SET ORCA POD + DISP

## (undated) DEVICE — AIR/WATER CLEANING ADAPTER FOR OLYMPUS® GI ENDOSCOPE: Brand: BULLDOG®

## (undated) DEVICE — FORCEPS BX L240CM WRK CHN 2.8MM STD CAP W/ NDL MIC MESH

## (undated) DEVICE — BW-412T DISP COMBO CLEANING BRUSH: Brand: SINGLE USE COMBINATION CLEANING BRUSH

## (undated) DEVICE — ENDOSCOPIC KIT 6X3/16 FT COLON W/ 1.1 OZ 2 GWN W/O BRSH